# Patient Record
Sex: FEMALE | Race: WHITE | NOT HISPANIC OR LATINO | Employment: UNEMPLOYED | URBAN - METROPOLITAN AREA
[De-identification: names, ages, dates, MRNs, and addresses within clinical notes are randomized per-mention and may not be internally consistent; named-entity substitution may affect disease eponyms.]

---

## 2017-03-02 ENCOUNTER — ALLSCRIPTS OFFICE VISIT (OUTPATIENT)
Dept: OTHER | Facility: OTHER | Age: 57
End: 2017-03-02

## 2017-03-02 DIAGNOSIS — M54.42 LOW BACK PAIN WITH LEFT-SIDED SCIATICA: ICD-10-CM

## 2017-03-02 DIAGNOSIS — M25.552 PAIN IN LEFT HIP: ICD-10-CM

## 2017-03-02 DIAGNOSIS — M25.551 PAIN IN RIGHT HIP: ICD-10-CM

## 2017-03-02 DIAGNOSIS — M25.561 PAIN IN RIGHT KNEE: ICD-10-CM

## 2017-03-03 ENCOUNTER — HOSPITAL ENCOUNTER (OUTPATIENT)
Dept: RADIOLOGY | Facility: MEDICAL CENTER | Age: 57
Discharge: HOME | End: 2017-03-03
Payer: COMMERCIAL

## 2017-03-03 DIAGNOSIS — M25.561 PAIN IN RIGHT KNEE: ICD-10-CM

## 2017-03-03 DIAGNOSIS — M25.551 PAIN IN RIGHT HIP: ICD-10-CM

## 2017-03-03 DIAGNOSIS — M25.552 PAIN IN LEFT HIP: ICD-10-CM

## 2017-03-03 PROCEDURE — 73521 X-RAY EXAM HIPS BI 2 VIEWS: CPT

## 2017-03-03 PROCEDURE — 73562 X-RAY EXAM OF KNEE 3: CPT

## 2017-03-04 ENCOUNTER — GENERIC CONVERSION - ENCOUNTER (OUTPATIENT)
Dept: OTHER | Facility: OTHER | Age: 57
End: 2017-03-04

## 2017-03-09 ENCOUNTER — TRANSCRIBE ORDERS (OUTPATIENT)
Dept: ADMINISTRATIVE | Facility: HOSPITAL | Age: 57
End: 2017-03-09

## 2017-03-09 ENCOUNTER — HOSPITAL ENCOUNTER (OUTPATIENT)
Dept: RADIOLOGY | Facility: MEDICAL CENTER | Age: 57
Discharge: HOME | End: 2017-03-09
Payer: COMMERCIAL

## 2017-03-09 DIAGNOSIS — M54.42 LOW BACK PAIN WITH LEFT-SIDED SCIATICA: ICD-10-CM

## 2017-03-09 PROCEDURE — 72110 X-RAY EXAM L-2 SPINE 4/>VWS: CPT

## 2017-03-10 ENCOUNTER — GENERIC CONVERSION - ENCOUNTER (OUTPATIENT)
Dept: OTHER | Facility: OTHER | Age: 57
End: 2017-03-10

## 2017-03-13 ENCOUNTER — APPOINTMENT (OUTPATIENT)
Dept: PHYSICAL THERAPY | Facility: CLINIC | Age: 57
End: 2017-03-13
Payer: COMMERCIAL

## 2017-03-13 DIAGNOSIS — M25.561 PAIN IN RIGHT KNEE: ICD-10-CM

## 2017-03-13 DIAGNOSIS — M25.552 PAIN IN LEFT HIP: ICD-10-CM

## 2017-03-13 DIAGNOSIS — M25.551 PAIN IN RIGHT HIP: ICD-10-CM

## 2017-03-13 PROCEDURE — 97161 PT EVAL LOW COMPLEX 20 MIN: CPT

## 2017-03-13 PROCEDURE — 97110 THERAPEUTIC EXERCISES: CPT

## 2017-03-15 ENCOUNTER — GENERIC CONVERSION - ENCOUNTER (OUTPATIENT)
Dept: OTHER | Facility: OTHER | Age: 57
End: 2017-03-15

## 2017-03-21 ENCOUNTER — APPOINTMENT (OUTPATIENT)
Dept: PHYSICAL THERAPY | Facility: CLINIC | Age: 57
End: 2017-03-21
Payer: COMMERCIAL

## 2017-03-21 PROCEDURE — 97140 MANUAL THERAPY 1/> REGIONS: CPT

## 2017-03-21 PROCEDURE — 97110 THERAPEUTIC EXERCISES: CPT

## 2017-03-28 ENCOUNTER — APPOINTMENT (OUTPATIENT)
Dept: PHYSICAL THERAPY | Facility: CLINIC | Age: 57
End: 2017-03-28
Payer: COMMERCIAL

## 2017-05-17 ENCOUNTER — GENERIC CONVERSION - ENCOUNTER (OUTPATIENT)
Dept: OTHER | Facility: OTHER | Age: 57
End: 2017-05-17

## 2017-09-11 ENCOUNTER — GENERIC CONVERSION - ENCOUNTER (OUTPATIENT)
Dept: FAMILY MEDICINE CLINIC | Facility: CLINIC | Age: 57
End: 2017-09-11

## 2017-11-15 ENCOUNTER — HOSPITAL ENCOUNTER (EMERGENCY)
Facility: HOSPITAL | Age: 57
Discharge: HOME | End: 2017-11-15
Attending: EMERGENCY MEDICINE | Admitting: EMERGENCY MEDICINE
Payer: COMMERCIAL

## 2017-11-15 ENCOUNTER — APPOINTMENT (EMERGENCY)
Dept: RADIOLOGY | Facility: HOSPITAL | Age: 57
End: 2017-11-15
Payer: COMMERCIAL

## 2017-11-15 VITALS
WEIGHT: 155 LBS | SYSTOLIC BLOOD PRESSURE: 121 MMHG | BODY MASS INDEX: 27.46 KG/M2 | TEMPERATURE: 97.7 F | HEART RATE: 72 BPM | OXYGEN SATURATION: 99 % | RESPIRATION RATE: 18 BRPM | DIASTOLIC BLOOD PRESSURE: 63 MMHG

## 2017-11-15 DIAGNOSIS — Q24.8 PERICARDIAL CYST: ICD-10-CM

## 2017-11-15 DIAGNOSIS — R10.13 EPIGASTRIC ABDOMINAL PAIN: Primary | ICD-10-CM

## 2017-11-15 LAB
ALBUMIN SERPL BCP-MCNC: 3.4 G/DL (ref 3.5–5)
ALP SERPL-CCNC: 69 U/L (ref 46–116)
ALT SERPL W P-5'-P-CCNC: 54 U/L (ref 12–78)
ANION GAP SERPL CALCULATED.3IONS-SCNC: 3 MMOL/L (ref 4–13)
AST SERPL W P-5'-P-CCNC: 47 U/L (ref 5–45)
ATRIAL RATE: 63 BPM
ATRIAL RATE: 69 BPM
BASOPHILS # BLD AUTO: 0.03 THOUSANDS/ΜL (ref 0–0.1)
BASOPHILS NFR BLD AUTO: 1 % (ref 0–1)
BILIRUB SERPL-MCNC: 0.25 MG/DL (ref 0.2–1)
BUN SERPL-MCNC: 20 MG/DL (ref 5–25)
CALCIUM SERPL-MCNC: 8.7 MG/DL (ref 8.3–10.1)
CHLORIDE SERPL-SCNC: 108 MMOL/L (ref 100–108)
CO2 SERPL-SCNC: 26 MMOL/L (ref 21–32)
CREAT SERPL-MCNC: 0.43 MG/DL (ref 0.6–1.3)
EOSINOPHIL # BLD AUTO: 0.03 THOUSAND/ΜL (ref 0–0.61)
EOSINOPHIL NFR BLD AUTO: 1 % (ref 0–6)
ERYTHROCYTE [DISTWIDTH] IN BLOOD BY AUTOMATED COUNT: 13.4 % (ref 11.6–15.1)
GFR SERPL CREATININE-BSD FRML MDRD: 115 ML/MIN/1.73SQ M
GLUCOSE SERPL-MCNC: 146 MG/DL (ref 65–140)
HCT VFR BLD AUTO: 36.2 % (ref 34.8–46.1)
HGB BLD-MCNC: 12.5 G/DL (ref 11.5–15.4)
LYMPHOCYTES # BLD AUTO: 1.88 THOUSANDS/ΜL (ref 0.6–4.47)
LYMPHOCYTES NFR BLD AUTO: 33 % (ref 14–44)
MCH RBC QN AUTO: 29.1 PG (ref 26.8–34.3)
MCHC RBC AUTO-ENTMCNC: 34.5 G/DL (ref 31.4–37.4)
MCV RBC AUTO: 84 FL (ref 82–98)
MONOCYTES # BLD AUTO: 0.37 THOUSAND/ΜL (ref 0.17–1.22)
MONOCYTES NFR BLD AUTO: 6 % (ref 4–12)
NEUTROPHILS # BLD AUTO: 3.42 THOUSANDS/ΜL (ref 1.85–7.62)
NEUTS SEG NFR BLD AUTO: 59 % (ref 43–75)
NRBC BLD AUTO-RTO: 0 /100 WBCS
P AXIS: 46 DEGREES
P AXIS: 55 DEGREES
PLATELET # BLD AUTO: 240 THOUSANDS/UL (ref 149–390)
PMV BLD AUTO: 9.1 FL (ref 8.9–12.7)
POTASSIUM SERPL-SCNC: 3.4 MMOL/L (ref 3.5–5.3)
PR INTERVAL: 134 MS
PR INTERVAL: 138 MS
PROT SERPL-MCNC: 7.3 G/DL (ref 6.4–8.2)
QRS AXIS: 16 DEGREES
QRS AXIS: 18 DEGREES
QRSD INTERVAL: 80 MS
QRSD INTERVAL: 84 MS
QT INTERVAL: 398 MS
QT INTERVAL: 420 MS
QTC INTERVAL: 407 MS
QTC INTERVAL: 450 MS
RBC # BLD AUTO: 4.29 MILLION/UL (ref 3.81–5.12)
SODIUM SERPL-SCNC: 137 MMOL/L (ref 136–145)
T WAVE AXIS: 37 DEGREES
T WAVE AXIS: 37 DEGREES
TROPONIN I SERPL-MCNC: <0.02 NG/ML
TROPONIN I SERPL-MCNC: <0.02 NG/ML
VENTRICULAR RATE: 63 BPM
VENTRICULAR RATE: 69 BPM
WBC # BLD AUTO: 5.76 THOUSAND/UL (ref 4.31–10.16)

## 2017-11-15 PROCEDURE — 99284 EMERGENCY DEPT VISIT MOD MDM: CPT

## 2017-11-15 PROCEDURE — 93005 ELECTROCARDIOGRAM TRACING: CPT | Performed by: EMERGENCY MEDICINE

## 2017-11-15 PROCEDURE — 84484 ASSAY OF TROPONIN QUANT: CPT | Performed by: EMERGENCY MEDICINE

## 2017-11-15 PROCEDURE — 71020 HB CHEST X-RAY 2VW FRONTAL&LATL: CPT | Performed by: EMERGENCY MEDICINE

## 2017-11-15 PROCEDURE — 36415 COLL VENOUS BLD VENIPUNCTURE: CPT

## 2017-11-15 PROCEDURE — 80053 COMPREHEN METABOLIC PANEL: CPT | Performed by: EMERGENCY MEDICINE

## 2017-11-15 PROCEDURE — 85025 COMPLETE CBC W/AUTO DIFF WBC: CPT | Performed by: EMERGENCY MEDICINE

## 2017-11-15 RX ORDER — MULTIVITAMIN
1 TABLET ORAL DAILY
COMMUNITY

## 2017-11-15 RX ORDER — RIBOFLAVIN (VITAMIN B2) 100 MG
100 TABLET ORAL DAILY
COMMUNITY

## 2017-11-15 RX ORDER — FAMOTIDINE 20 MG/1
20 TABLET, FILM COATED ORAL 2 TIMES DAILY
Qty: 30 TABLET | Refills: 0 | Status: SHIPPED | OUTPATIENT
Start: 2017-11-15 | End: 2018-11-20 | Stop reason: ALTCHOICE

## 2017-11-15 NOTE — ED PROVIDER NOTES
History  Chief Complaint   Patient presents with    Abdominal Pain     P:t had sudden onset of epigastric pain that radiated into her chest that has since gone away  Pt is pain free upon arrival       HPI    72-year-old female with unremarkable past medical history presenting with chief complaint of epigastric pain that radiated into her chest and has completely gone away on presentation to the ED  This episode lasted for 5 minutes  Patient is pain free now  Pain was described as gassy crampy 10/10 pain with radiation up to her chest   Patient has never had anything like this before  No aggravating or remitting any factors  Patient is asymptomatic  Patient admitted to nausea without vomiting  Patient admitted to diaphoresis as well  Patient denies fever chills rigors headache lightheadedness dizziness chest pain palpitations shortness of breath cough pleurisy vomiting diarrhea constipation urinary symptoms, motor weakness, numbness and tingling  Patient has never had the VTE before, no recent long traveling, no exogenous hormones  Patient had a normal breakfast this morning, had a routine last 24 hours according to patient  Prior to Admission Medications   Prescriptions Last Dose Informant Patient Reported? Taking? Ascorbic Acid (VITAMIN C) 100 MG tablet 11/15/2017 at Unknown time  Yes Yes   Sig: Take 100 mg by mouth daily   Multiple Vitamin (MULTIVITAMIN) tablet 11/15/2017 at Unknown time  Yes Yes   Sig: Take 1 tablet by mouth daily   b complex vitamins tablet 11/15/2017 at Unknown time  Yes Yes   Sig: Take 1 tablet by mouth daily   ergocalciferol (VITAMIN D2) 50,000 units 11/15/2017 at Unknown time  Yes Yes   Sig: Take 1,000 Units by mouth daily        Facility-Administered Medications: None       History reviewed  No pertinent past medical history      Past Surgical History:   Procedure Laterality Date    CHOLECYSTECTOMY      NO PAST SURGERIES      LA COLONOSCOPY FLX DX W/COLLJ SPEC WHEN PFRMD N/A 4/26/2016    Procedure: COLONOSCOPY;  Surgeon: Teresa Porter MD;  Location: [facility]; Service: Gastroenterology       History reviewed  No pertinent family history  I have reviewed and agree with the history as documented  Social History   Substance Use Topics    Smoking status: Never Smoker    Smokeless tobacco: Never Used    Alcohol use No        Review of Systems   Constitutional: Negative for activity change, appetite change, chills, diaphoresis, fatigue, fever and unexpected weight change  HENT: Negative for congestion, ear discharge, ear pain, facial swelling, hearing loss, nosebleeds, postnasal drip, rhinorrhea, sinus pressure, sneezing, sore throat and tinnitus  Eyes: Negative for photophobia, pain, redness, itching and visual disturbance  Respiratory: Negative for cough, chest tightness, shortness of breath, wheezing and stridor  Cardiovascular: Negative for chest pain, palpitations and leg swelling  Gastrointestinal: Negative for abdominal distention, abdominal pain, anal bleeding, blood in stool, constipation, diarrhea, nausea and vomiting  Endocrine: Negative for polydipsia, polyphagia and polyuria  Genitourinary: Negative for decreased urine volume, difficulty urinating, dysuria, enuresis, flank pain, frequency, hematuria, menstrual problem, urgency, vaginal bleeding, vaginal discharge and vaginal pain  Musculoskeletal: Negative for arthralgias, back pain, gait problem, joint swelling, myalgias, neck pain and neck stiffness  Skin: Negative for rash and wound  Allergic/Immunologic: Negative for environmental allergies, food allergies and immunocompromised state  Neurological: Negative for dizziness, tremors, seizures, syncope, facial asymmetry, speech difficulty, weakness, light-headedness, numbness and headaches  Hematological: Negative for adenopathy     Psychiatric/Behavioral: Negative for agitation, behavioral problems, confusion, dysphoric mood, hallucinations, self-injury, sleep disturbance and suicidal ideas  The patient is not nervous/anxious and is not hyperactive  All other systems reviewed and are negative  Physical Exam  ED Triage Vitals   Temperature Pulse Respirations Blood Pressure SpO2   11/15/17 1039 11/15/17 1039 11/15/17 1039 11/15/17 1039 11/15/17 1039   97 7 °F (36 5 °C) 65 18 132/63 98 %      Temp Source Heart Rate Source Patient Position - Orthostatic VS BP Location FiO2 (%)   11/15/17 1039 11/15/17 1505 11/15/17 1039 11/15/17 1039 --   Oral Monitor Lying Left arm       Pain Score       11/15/17 1039       No Pain           Orthostatic Vital Signs  Vitals:    11/15/17 1133 11/15/17 1233 11/15/17 1505 11/15/17 1600   BP: 129/67 117/77 115/71 121/63   Pulse: 65 69 68 72   Patient Position - Orthostatic VS:   Lying Sitting       Physical Exam   Constitutional: She is oriented to person, place, and time  She appears well-developed and well-nourished  No distress  HENT:   Head: Normocephalic and atraumatic  Right Ear: External ear normal    Left Ear: External ear normal    Nose: Nose normal    Mouth/Throat: Oropharynx is clear and moist  No oropharyngeal exudate  Eyes: Conjunctivae and EOM are normal  Pupils are equal, round, and reactive to light  Right eye exhibits no discharge  Left eye exhibits no discharge  No scleral icterus  Neck: Normal range of motion  Neck supple  No JVD present  No tracheal deviation present  No thyromegaly present  Cardiovascular: Normal rate, regular rhythm, S1 normal, S2 normal, normal heart sounds and intact distal pulses  No murmur heard  Pulses:       Carotid pulses are 2+ on the right side, and 2+ on the left side  Radial pulses are 2+ on the right side, and 2+ on the left side  Dorsalis pedis pulses are 2+ on the right side, and 2+ on the left side  Posterior tibial pulses are 2+ on the right side, and 2+ on the left side     Pulmonary/Chest: Effort normal and breath sounds normal  No stridor  No respiratory distress  She has no wheezes  Abdominal: Soft  Bowel sounds are normal  She exhibits no distension and no mass  There is tenderness (mild) in the epigastric area  There is no rebound and no guarding  No hernia  Musculoskeletal: Normal range of motion  She exhibits no edema, tenderness or deformity  RLU and LLE: Homans sign negative bilaterally, no calf erythema bilaterally, no palpable cords bilaterally no erythema or edema  Lymphadenopathy:     She has no cervical adenopathy  Neurological: She is alert and oriented to person, place, and time  She displays normal reflexes  No cranial nerve deficit  She exhibits normal muscle tone  Skin: Skin is warm and dry  No rash noted  She is not diaphoretic  No erythema  Psychiatric: She has a normal mood and affect  Her behavior is normal  Judgment and thought content normal    Nursing note and vitals reviewed  ED Medications  Medications - No data to display    Diagnostic Studies  Results Reviewed     Procedure Component Value Units Date/Time    Troponin I [[id number]]  (Normal) Collected:  11/15/17 1502    Lab Status:  Final result Specimen:  Blood from Arm, Left Updated:  11/15/17 1545     Troponin I <0 02 ng/mL     Narrative:         [employer] Chemistry analyzer 99% cutoff is > 0 04 ng/mL in network labs    o cTnI 99% cutoff is useful only when applied to patients in the clinical setting of myocardial ischemia  o cTnI 99% cutoff should be interpreted in the context of clinical history, ECG findings and possibly cardiac imaging to establish correct diagnosis  o cTnI 99% cutoff may be suggestive but clearly not indicative of a coronary event without the clinical setting of myocardial ischemia      Comprehensive metabolic panel [[id number]]  (Abnormal) Collected:  11/15/17 1101    Lab Status:  Final result Specimen:  Blood from Arm, Left Updated:  11/15/17 1129     Sodium 137 mmol/L      Potassium 3 4 (L) mmol/L      Chloride 108 mmol/L CO2 26 mmol/L      Anion Gap 3 (L) mmol/L      BUN 20 mg/dL      Creatinine 0 43 (L) mg/dL      Glucose 146 (H) mg/dL      Calcium 8 7 mg/dL      AST 47 (H) U/L      ALT 54 U/L      Alkaline Phosphatase 69 U/L      Total Protein 7 3 g/dL      Albumin 3 4 (L) g/dL      Total Bilirubin 0 25 mg/dL      eGFR 115 ml/min/1 73sq m     Narrative:         National Kidney Disease Education Program recommendations are as follows:  GFR calculation is accurate only with a steady state creatinine  Chronic Kidney disease less than 60 ml/min/1 73 sq  meters  Kidney failure less than 15 ml/min/1 73 sq  meters  Troponin I [[id number]]  (Normal) Collected:  11/15/17 1101    Lab Status:  Final result Specimen:  Blood from Arm, Left Updated:  11/15/17 1129     Troponin I <0 02 ng/mL     Narrative:         [employer] Chemistry analyzer 99% cutoff is > 0 04 ng/mL in network labs    o cTnI 99% cutoff is useful only when applied to patients in the clinical setting of myocardial ischemia  o cTnI 99% cutoff should be interpreted in the context of clinical history, ECG findings and possibly cardiac imaging to establish correct diagnosis  o cTnI 99% cutoff may be suggestive but clearly not indicative of a coronary event without the clinical setting of myocardial ischemia      CBC and differential [[id number]]  (Normal) Collected:  11/15/17 1101    Lab Status:  Final result Specimen:  Blood from Arm, Left Updated:  11/15/17 1112     WBC 5 76 Thousand/uL      RBC 4 29 Million/uL      Hemoglobin 12 5 g/dL      Hematocrit 36 2 %      MCV 84 fL      MCH 29 1 pg      MCHC 34 5 g/dL      RDW 13 4 %      MPV 9 1 fL      Platelets 700 Thousands/uL      nRBC 0 /100 WBCs      Neutrophils Relative 59 %      Lymphocytes Relative 33 %      Monocytes Relative 6 %      Eosinophils Relative 1 %      Basophils Relative 1 %      Neutrophils Absolute 3 42 Thousands/µL      Lymphocytes Absolute 1 88 Thousands/µL      Monocytes Absolute 0 37 Thousand/µL Eosinophils Absolute 0 03 Thousand/µL      Basophils Absolute 0 03 Thousands/µL                  X-ray chest 2 views   ED Interpretation by Anjel Curtis DO (11/15 1130)   There is a nodular density in the right cardiophrenic angle, appearing increased from the prior exam, indeterminate but possibly a pericardial cyst or pericardial fat  Nonemergent CT evaluation recommended  Final Result by Hiral Green MD (11/15 1122)   There is a nodular density in the right cardiophrenic angle, appearing increased from the prior exam, indeterminate but possibly a pericardial cyst or pericardial fat  Nonemergent CT evaluation recommended           ##imslh##imslh      ##[id number]##[id number]         Workstation performed: [id number]               Procedures  ECG 12 Lead Documentation  Date/Time: 11/15/2017 12:06 PM  Performed by: Claudia De La Cruz by: Pablo Ramachandran     Indications / Diagnosis:  Abdominal pain  ECG reviewed by me, the ED Provider: yes    Patient location:  ED  Previous ECG:     Previous ECG:  Compared to current    Similarity:  No change  Interpretation:     Interpretation: abnormal    Rate:     ECG rate:  63    ECG rate assessment: normal    Rhythm:     Rhythm: sinus rhythm    Ectopy:     Ectopy: none    QRS:     QRS axis:  Normal  Conduction:     Conduction: normal    ST segments:     ST segments:  Normal  T waves:     T waves: flattening      Flattening:  AVL and III  ECG 12 Lead Documentation  Date/Time: 11/15/2017 11:45 AM  Performed by: Claudia De La Cruz by: Pablo Ramachandran     Patient location:  ED  Previous ECG:     Previous ECG:  Compared to current    Similarity:  No change  Interpretation:     Interpretation: abnormal    Quality:     Tracing quality:  Limited by artifact  Rate:     ECG rate assessment: normal    Rhythm:     Rhythm: sinus rhythm    Ectopy:     Ectopy: none    QRS:     QRS axis:  Normal  Conduction:     Conduction: normal    ST segments:     ST segments: Normal  T waves:     T waves: flattening and inverted      Flattening:  V4    Inverted:  V2 and V3          Phone Consults  ED Phone Contact    ED Course  ED Course as of Nov 16 1604   Wed Nov 15, 2017   1305 Patient asymptomatic at this time    1510 Patient is pain-free at this time          HEART Risk Score    Flowsheet Row Most Recent Value   History  0 Filed at: 11/15/2017 1135   ECG  1 Filed at: 11/15/2017 1135   Age  1 Filed at: 11/15/2017 1135   Risk Factors  1 Filed at: 11/15/2017 1135   Troponin  0 Filed at: 11/15/2017 1135   Heart Score Risk Calculator   History  0 Filed at: 11/15/2017 1135   ECG  1 Filed at: 11/15/2017 1135   Age  1 Filed at: 11/15/2017 1135   Risk Factors  1 Filed at: 11/15/2017 1135   Troponin  0 Filed at: 11/15/2017 1135   HEART Score  3 Filed at: 11/15/2017 1135   HEART Score  3 Filed at: 11/15/2017 1135            PERC Rule for PE    Flowsheet Row Most Recent Value   PERC Rule for PE   Age >=50  0 Filed at: 11/16/2017 1532   HR >=100  0 Filed at: 11/16/2017 1532   O2 Sat on room air < 95%  0 Filed at: 11/16/2017 1532   History of PE or DVT  0 Filed at: 11/16/2017 1532   Recent trauma or surgery  0 Filed at: 11/16/2017 1532   Hemoptysis  0 Filed at: 11/16/2017 1532   Exogenous estrogen  0 Filed at: 11/16/2017 1532   Unilateral leg swelling  0 Filed at: 11/16/2017 1532   PERC Rule for PE Results  0 Filed at: 11/16/2017 1532                Wells' Criteria for PE    Flowsheet Row Most Recent Value   Wells' Criteria for PE   Clinical signs and symptoms of DVT  0 Filed at: 11/16/2017 1532   PE is primary diagnosis or equally likely  0 Filed at: 11/16/2017 1532   HR >100  0 Filed at: 11/16/2017 1532   Immobilization at least 3 days or Surgery in the previous 4 weeks  0 Filed at: 11/16/2017 1532   Previous, objectively diagnosed PE or DVT  0 Filed at: 11/16/2017 1532   Hemoptysis  0 Filed at: 11/16/2017 1532   Malignancy with treatment within 6 months or palliative  0 Filed at: 11/16/2017 3101 [employer]  0 Filed at: 11/16/2017 1532            [facility]  Number of Diagnoses or Management Options  Epigastric abdominal pain:   Pericardial cyst:   Diagnosis management comments: 32-year-old female presenting with chief complaint of epigastric pain which has remitted  Patient has limited past medical history  On exam vital signs normal  Patient does not have any abdominal tenderness, benign physical exam   Heart score of 3 initial EKG limited by artifact, repeat EKG T-wave abnormalities, flattening in aVL and lead 3  Delta EKG at 3 hrs showed normal sinus rhythm, persistent flattening in aVL only 3  Heart score of 3  Initial troponin and delta troponin negative  Patient asymptomatic through ED evaluation  Patient will have echo stress test in the next 1-2 days and Cardiology follow-up in the next 1-2 days  Discussed plan with patient she agrees to follow up with Cardiology and stress test in the next 1-2 days  Shared decision making was discussed with the patient  Offered admission to patient, patient states she is ready to go home as she is asymptomatic  Discussed the risk and benefits of this decision, patient accepts risks of discharge and understands benefits of admission  Patient educated on pericardial cyst, patient will follow up with PCP for CT scan for follow-up  Chest x-ray report given to patient  ED return precautions discussed  CritCare Time    Disposition  Final diagnoses:   Epigastric abdominal pain   Pericardial cyst     Time reflects when diagnosis was documented in both MDM as applicable and the Disposition within this note     Time User Action Codes Description Comment    11/15/2017  3:53 PM Tabitha Pineda Add [R10 13] Epigastric abdominal pain     11/15/2017  3:53 PM Sohan Moralez Add [Q24 8] Pericardial cyst       ED Disposition     ED Disposition Condition Comment    Discharge  Jasvir Salazar discharge to home/self care      Condition at discharge: Good    Return precautions were discussed with patient  Patient understands when to return to  Emergency department  Patient agrees to discharge plan and follow up care  Follow-up Information     Follow up With Specialties Details Why Contact Info    James Reddy DO Family Medicine Go in 1 day  [address] [facility]  [phone]      Pamila Lesches, MD Cardiology Call in 1 day  [address]  [phone]          Discharge Medication List as of 11/15/2017  4:12 PM      START taking these medications    Details   famotidine (PEPCID) 20 mg tablet Take 1 tablet by mouth 2 (two) times a day, Starting Wed 11/15/2017, Print         CONTINUE these medications which have NOT CHANGED    Details   Ascorbic Acid (VITAMIN C) 100 MG tablet Take 100 mg by mouth daily, Historical Med      b complex vitamins tablet Take 1 tablet by mouth daily, Historical Med      ergocalciferol (VITAMIN D2) 50,000 units Take 1,000 Units by mouth daily  , Historical Med      Multiple Vitamin (MULTIVITAMIN) tablet Take 1 tablet by mouth daily, Historical Med             Outpatient Discharge Orders  Echo stress test w contrast if indicated   Standing Status: Future  Standing Exp  Date: 11/15/21         ED Provider  Attending physically available and evaluated [facility]  I managed the patient along with the ED Attending      Electronically Signed by         Sarah Eric DO  Resident  11/16/17 7898

## 2017-11-15 NOTE — DISCHARGE INSTRUCTIONS
Abdominal Pain   WHAT YOU NEED TO KNOW:   Abdominal pain can be dull, achy, or sharp  You may have pain in one area of your abdomen, or in your entire abdomen  Your pain may be caused by a condition such as constipation, food sensitivity or poisoning, infection, or a blockage  Abdominal pain can also be from a hernia, appendicitis, or an ulcer  Liver, gallbladder, or kidney conditions can also cause abdominal pain  The cause of your abdominal pain may be unknown  DISCHARGE INSTRUCTIONS:   Return to the emergency department if:   · You have new chest pain or shortness of breath  · You have pulsing pain in your upper abdomen or lower back that suddenly becomes constant  · Your pain is in the right lower abdominal area and worsens with movement  · You have a fever over 100 4°F (38°C) or shaking chills  · You are vomiting and cannot keep food or liquids down  · Your pain does not improve or gets worse over the next 8 to 12 hours  · You see blood in your vomit or bowel movements, or they look black and tarry  · Your skin or the whites of your eyes turn yellow  · You are a woman and have a large amount of vaginal bleeding that is not your monthly period  Contact your healthcare provider if:   · You have pain in your lower back  · You are a man and have pain in your testicles  · You have pain when you urinate  · You have questions or concerns about your condition or care  Follow up with your healthcare provider within 24 hours or as directed:  Write down your questions so you remember to ask them during your visits  Medicines:   · Medicines  may be given to calm your stomach and prevent vomiting or to decrease pain  Ask how to take pain medicine safely  · Take your medicine as directed  Contact your healthcare provider if you think your medicine is not helping or if you have side effects  Tell him of her if you are allergic to any medicine   Keep a list of the medicines, vitamins, and herbs you take  Include the amounts, and when and why you take them  Bring the list or the pill bottles to follow-up visits  Carry your medicine list with you in case of an emergency  © 2017 [employer] [employer] Information is for End User's use only and may not be sold, redistributed or otherwise used for commercial purposes  All illustrations and images included in CareNotes® are the copyrighted property of [employer]  or [address]  The above information is an  only  It is not intended as medical advice for individual conditions or treatments  Talk to your doctor, nurse or pharmacist before following any medical regimen to see if it is safe and effective for you

## 2017-11-15 NOTE — ED ATTENDING ATTESTATION
Connie Sams DO, saw and evaluated the patient  I have discussed the patient with the resident/non-physician practitioner and agree with the resident's/non-physician practitioner's findings, Plan of Care, and MDM as documented in the resident's/non-physician practitioner's note, except where noted  All available labs and Radiology studies were reviewed  At this point I agree with the current assessment done in the Emergency Department  I have conducted an independent evaluation of this patient a history and physical is as follows:    56 yof with PMH of cholecystectomy reports sudden onset of epicastric pain lasting 5 minutes, occurred one hour ago and she is now asymptomatic  She was diaphoretic at the time  She is now asymptomatic  She denies eating anything abnormal that would perhaps elicit any GERD  Her EKG shows nonspecific repolarization abnormalities in V2 and V3 with artifact and will be repeated  Troponin was negative  CMP was negative  Lipase added  Chest x-ray revealed a diaphragmatic cyst and follow-up is recommended  /63   Pulse 65   Temp 97 7 °F (36 5 °C) (Oral)   Resp 18   Wt 70 3 kg (155 lb)   SpO2 98%   BMI 27 46 kg/m²     Patient generally well   alert oriented x4   heart was regular   lungs were clear   abdomen benign   extremities unremarkable     heart score is 3 and plan for delta troponin and repeat EKG per hospital protocol  delta troponin negative and multiple EKGs without ischemia or dynamic changes  Prescription for stress test provided and follow-up recommended with PCP tomorrow  Patient agrees with this plan        Diagnosis   epigastric pain   diaphragmatic nodule  Versus cyst as found incidentally on  Chest x-ray  Critical Care Time  CritCare Time

## 2017-11-16 LAB
ATRIAL RATE: 72 BPM
P AXIS: 60 DEGREES
PR INTERVAL: 128 MS
QRS AXIS: 25 DEGREES
QRSD INTERVAL: 82 MS
QT INTERVAL: 400 MS
QTC INTERVAL: 438 MS
T WAVE AXIS: 53 DEGREES
VENTRICULAR RATE: 72 BPM

## 2017-12-06 ENCOUNTER — GENERIC CONVERSION - ENCOUNTER (OUTPATIENT)
Dept: OTHER | Facility: OTHER | Age: 57
End: 2017-12-06

## 2017-12-06 DIAGNOSIS — Z12.39 ENCOUNTER FOR OTHER SCREENING FOR MALIGNANT NEOPLASM OF BREAST: ICD-10-CM

## 2017-12-08 ENCOUNTER — GENERIC CONVERSION - ENCOUNTER (OUTPATIENT)
Dept: OTHER | Facility: OTHER | Age: 57
End: 2017-12-08

## 2017-12-08 ENCOUNTER — HOSPITAL ENCOUNTER (OUTPATIENT)
Dept: NON INVASIVE DIAGNOSTICS | Facility: HOSPITAL | Age: 57
Discharge: HOME | End: 2017-12-08
Attending: EMERGENCY MEDICINE
Payer: COMMERCIAL

## 2017-12-08 DIAGNOSIS — R10.13 EPIGASTRIC ABDOMINAL PAIN: ICD-10-CM

## 2017-12-08 LAB
CHEST PAIN STATEMENT: NORMAL
MAX DIASTOLIC BP: 60 MMHG
MAX HEART RATE: 179 BPM
MAX PREDICTED HEART RATE: 165 BPM
MAX. SYSTOLIC BP: 164 MMHG
PROTOCOL NAME: NORMAL
REASON FOR TERMINATION: NORMAL
TARGET HR FORMULA: NORMAL
TEST INDICATION: NORMAL
TIME IN EXERCISE PHASE: NORMAL

## 2017-12-08 PROCEDURE — 93350 STRESS TTE ONLY: CPT

## 2017-12-21 ENCOUNTER — ALLSCRIPTS OFFICE VISIT (OUTPATIENT)
Dept: OTHER | Facility: OTHER | Age: 57
End: 2017-12-21

## 2017-12-22 NOTE — PROGRESS NOTES
Assessment   1  Anterior chest wall pain (786 52) (R07 89)   2  Pericardial cyst (236 89) (Q24 8)    Plan    · EKG/ECG- POC; Status:Complete;   Done: [id number]   Perform: In Office; [phone]; Last Updated By:Amalia Dolan; 12/21/2017 10:15:43 AM;Ordered; For:Palpitations; Ordered By:Tae Angel;   · CTA CHEST WO W CONTRAST; Status:Need Information - Financial Authorization; Requested for:[id number];    Perform:[facility]; GUN:[id number]; Ordered; For:Pericardial cyst; Ordered By:Tae Angel;   · Follow-up visit in 1 month Evaluation and Treatment  Follow-up  Status: Hold For -    Scheduling  Requested for: [id number]   Ordered; For: Pericardial cyst; Ordered By: Lilli Solorio Performed:  Due: 06RHQ6733    Discussion/Summary      Episode of chest and abdominal pain  She had an outpatient stress echocardiogram that was normal  A chest x-ray was concerning for pericardial cyst  I will have her do a CT of the chest to better delineate a mass seen on chest x-ray  It may represent a pericardial cyst  I will follow up with her make further recommendations  Chief Complaint   patient at the office for initial consult, patient was at the hospital due to sharp pain on her chest       History of Present Illness   HPI: A 61-year-old female with no cardiac history who had presented to the emergency department November 15, 2017 with lower chest and upper abdominal pain that was sharp and intense but transient  Workup was unremarkable on and it was felt to have a GI source  She has had no pain since  She denies chest pain shortness of breath orthopnea paroxysmal nocturnal dyspnea or syncope    had chest what x-ray while in the ED  It was read as having a nodular density in the right cardiophrenic angle  Increased from prior exam and may be a pericardial cyst of pericardial fat  Review of Systems           Cardiac: No complaints of chest pain, no palpitations, no fainting -- and-- as noted in HPI  Skin: No complaints of nonhealing sores or skin rash  Genitourinary: No complaints of recurrent urinary tract infections, frequent urination at night, difficult urination, blood in urine, kidney stones, loss of bladder control, kidney problems, denies any birth control or hormone replacement, is not post menopausal, not currently pregnant  Psychological: No complaints of feeling depressed, anxiety, panic attacks, or difficulty concentrating  General: No complaints of trouble sleeping, lack of energy, fatigue, appetite changes, weight changes, fever, frequent infections, or night sweats  Respiratory: No complaints of shortness of breath, cough with sputum, or wheezing  HEENT: No complaints of serious problems, hearing problems, nose problems, throat problems, or snoring  Gastrointestinal: No complaints of liver problems, nausea, vomiting, heartburn, constipation, bloody stools, diarrhea, problems swallowing, adbominal pain, or rectal bleeding  Hematologic: No complaints of bleeding disorders, anemia, blood clots, or excessive brusing  Neurological: No complaints of numbness, tingling, dizziness, weakness, seizures, headaches, syncope or fainting, AM fatigue, daytime sleepiness, no witnessed apnea episodes  Musculoskeletal: No complaints of arthritis, back pain, or painfull swelling  ROS reviewed  Active Problems   1  Acute left-sided low back pain with left-sided sciatica (724 2,724 3) (M54 42)   2  Acute pain of right knee (719 46) (M25 561)   3  Anxiety (300 00) (F41 9)   4  Encounter for other screening for malignant neoplasm of breast (V76 19) (Z12 39)   5  Hip pain, left (719 45) (M25 552)   6  Hip pain, right (719 45) (M25 551)   7  Hyperlipidemia (272 4) (E78 5)   8  Influenza vaccine needed (V04 81) (Z23)   9  Shortness of breath (786 05) (R06 02)   10  Solitary pulmonary nodule (793 11) (R91 1)   11   Vitamin D deficiency (268 9) (E55 9)    Past Medical History    · History of Encounter for routine gynecological examination (V72 31) (Z01 419)   · History of Encounter for screening colonoscopy (V76 51) (Z12 11)   · History of Encounter for screening mammogram for malignant neoplasm of breast    (V76 12) (Z12 31)   · History of Encounter for vitamin deficiency screening (V77 99) (Z13 21)   · History of Encounter to establish care with new doctor (V65 8) (Z76 89)   · History of Flu vaccine need (V04 81) (Z23)   · History of abdominal pain (V13 89) (J30 024)   · History of acute pharyngitis (V12 69) (Z87 09)   · History of influenza vaccination (V49 89) (Z92 29)   · History of Need for Tdap vaccination (V06 1) (Z23)   · History of Right knee pain (719 46) (M25 561)   · History of Screening for cholesterol level (V77 91) (Z13 220)   · History of Screening for thyroid disorder (V77 0) (Z13 29)     The active problems and past medical history were reviewed and updated today  Surgical History    · History of Cholecystotomy     The surgical history was reviewed and updated today  Family History   Mother    · No pertinent family history  Family History    · Family history of No Significant Family History     The family history was reviewed and updated today  Social History    · Denied: History of Alcohol Use (History)   · Never a smoker   · Never A Smoker  The social history was reviewed and updated today  The social history was reviewed and is unchanged  Current Meds    1  Multiple Vitamin CAPS; take 1 capsule daily; Therapy: (Lorrane Delonte) to Recorded   2  Vitamin B Complex CAPS; take 1 capsule daily; Therapy: (Lorrane Delonte) to Recorded   3  Vitamin C TABS; TAKE 1 TABLET DAILY; Therapy: (Lorrane Delonte) to Recorded   4  Vitamin D 1000 UNIT Oral Tablet; TAKE 1 TABLET DAILY; Therapy: (Recorded:17Oct5708) to Recorded     The medication list was reviewed and updated today  Allergies   1   No Known Drug Allergies    Vitals   Signs   Heart Rate: 69  Systolic: 980, RUE, Sitting  Diastolic: 80, RUE, Sitting  Height: 5 ft 3 in  Weight: 159 lb 3 oz  BMI Calculated: 28 2  BSA Calculated: 1 76    Physical Exam        Constitutional - General appearance: No acute distress, well appearing and well nourished  Eyes - Conjunctiva and Sclera examination: Conjunctiva pink, sclera anicteric  Neck - Normal, no JVD   Pulmonary - Respiratory effort: No signs of respiratory distress  -- Auscultation of lungs: Clear to auscultation  Cardiovascular - Auscultation of heart: Normal rate and rhythm, normal S1 and S2, no murmurs  -- Pedal pulses: Normal, 2+ bilaterally  -- Examination of extremities for edema and/or varicosities: Normal        Abdomen - Soft  Musculoskeletal - Gait and station: Normal gait  Skin - Skin: Normal without rashes  Skin is warm and well perfused  Neurologic - Speech normal  No focal deficits  Psychiatric - Orientation to person, place, and time: Normal       Results/Data        Rhythm and rate:  normal sinus rhythm        QRS: low voltage      Signatures    Electronically signed by : KVNG Marrero ; Dec 21 2017  3:40PM EST                       (Author)

## 2018-01-10 NOTE — RESULT NOTES
Verified Results  * XR SPINE LUMBAR MINIMUM 4 VIEWS NON INJURY [id number] 08:44AM Aarti Star Order Number: [id number]     Test Name Result Flag Reference   XR SPINE LUMBAR MINIMUM 4 VIEWS (Report)     LUMBAR SPINE     INDICATION: Low back pain with sciatica  Pain radiating down both legs for 3 to 4 months  No history of trauma  COMPARISON: None     VIEWS: AP, lateral, bilateral oblique and coned down projections     IMAGES: 5     FINDINGS:     Alignment is unremarkable  There is no radiographic evidence of acute fracture or destructive osseous lesion  Mild disc space narrowing throughout the lumbar spine  Degenerative endplate changes and associated facet hypertrophic changes throughout the lumbar spine  Visualized soft tissues appear unremarkable  IMPRESSION:   Mild degenerative changes         Workstation performed: [id number]     Signed by:   Dannie Snellen, DO   3/10/17

## 2018-01-12 NOTE — RESULT NOTES
Message  Ms  Jasvir Gramajomarcel,  Our office has attempted to reach you by phone in regards to your results from the Colonoscopy on 04/06/2016  The polyp that was removed was benign  With these results Dr Patrice Henriquez recommends a repeat Colonoscopy in 5 years  We will call you in that time to schedule  If you have any questions please call our office at [phone]      Sincerely,  [facility]      Signatures   Electronically signed by : Gigi Dangelo, ; Jul 14 2016  1:39PM EST                       (Author)

## 2018-01-13 VITALS
HEIGHT: 63 IN | HEART RATE: 75 BPM | RESPIRATION RATE: 16 BRPM | BODY MASS INDEX: 27.46 KG/M2 | SYSTOLIC BLOOD PRESSURE: 114 MMHG | TEMPERATURE: 96.9 F | DIASTOLIC BLOOD PRESSURE: 74 MMHG | OXYGEN SATURATION: 98 % | WEIGHT: 155 LBS

## 2018-01-13 NOTE — RESULT NOTES
Verified Results  * MAMMO SCREENING BILATERAL W CAD [id number] 10:00AM Rajat Cedeño     Test Name Result Flag Reference   MAMMO SCREENING BILATERAL W CAD (Report)     Patient History:   Patient is postmenopausal    No known family history of cancer  Patient has never smoked  Patient's BMI is 24 6  Reason for exam: screening (asymptomatic)  Mammo Screening Bilateral W CAD: March 1, 2016 - Check In #:    [de-identified]   Bilateral CC and MLO view(s) were taken  Technologist: Iris Veronica, RT(R)(M)   No prior studies available for comparison  The breast tissue is heterogeneously dense, potentially limiting    the sensitivity of mammography  Patient risk, included in this    report, assists in determining the appropriate screening regimen    (such as 3-D mammography or the inclusion of automated breast    ultrasound or MRI)  3-D mammography may also remain indicated as    screening  No dominant soft tissue mass, architectural distortion or    suspicious calcifications are noted  The skin and nipple    contours are within normal limits  No evidence of malignancy  No significant changes   when compared with prior studies  ASSESSMENT: BiRad:1 - Negative     Recommendation:   Routine screening mammogram of both breasts in 1 year  A    reminder letter will be sent  Analyzed by CAD     8-10% of cancers will be missed on mammography  Management of a    palpable abnormality must be based on clinical grounds  Patients   will be notified of their results via letter from our facility  Accredited by [employer] of Radiology and FDA  Transcription Location:  [place] [address]: [id number]     Risk Value(s):   Arianneer-Cuzick 10 Year: 1 617%, Tyrer-Cuzick Lifetime: 6 104%,    Myriad Table: 1 5%, TEODORA 5 Year: 1 0%, NCI Lifetime: 7 7%   Signed by:    Willis Erazo MD   3/1/16

## 2018-01-17 NOTE — RESULT NOTES
Verified Results  (1) CBC/PLT/DIFF [id number] 06:00AM Aristeo Courtney     Test Name Result Flag Reference   WHITE BLOOD CELL COUNT 5 4 Thousand/uL  3 8-10 8   RED BLOOD CELL COUNT 4 66 Million/uL  3 80-5 10   HEMOGLOBIN 13 2 g/dL  11 7-15 5   HEMATOCRIT 40 7 %  35 0-45 0   MCV 87 3 fL  80 0-100 0   MCH 28 4 pg  27 0-33 0   MCHC 32 5 g/dL  32 0-36 0   RDW 14 4 %  11 0-15 0   PLATELET COUNT 099 Thousand/uL  140-400   MPV 7 8 fL  7 5-11 5   ABSOLUTE NEUTROPHILS 2311 cells/uL  5196-7059   ABSOLUTE LYMPHOCYTES 2662 cells/uL  850-3900   ABSOLUTE MONOCYTES 378 cells/uL  200-950   ABSOLUTE EOSINOPHILS 22 cells/uL     ABSOLUTE BASOPHILS 27 cells/uL  0-200   NEUTROPHILS 42 8 %     LYMPHOCYTES 49 3 %     MONOCYTES 7 0 %     EOSINOPHILS 0 4 %     BASOPHILS 0 5 %       (1) COMPREHENSIVE METABOLIC PANEL [id number] 15:18FP Aristeo Courtney     Test Name Result Flag Reference   GLUCOSE 95 mg/dL  65-99   Fasting reference interval   UREA NITROGEN (BUN) 22 mg/dL  7-25   CREATININE 0 56 mg/dL  0 50-1 05   For patients >52years of age, the reference limit  for Creatinine is approximately 13% higher for people  identified as -American  eGFR NON-AFR   AMERICAN 106 mL/min/1 73m2  > OR = 60   eGFR AFRICAN AMERICAN 123 mL/min/1 73m2  > OR = 60   BUN/CREATININE RATIO   7-96   NOT APPLICABLE (calc)   SODIUM 142 mmol/L  135-146   POTASSIUM 4 3 mmol/L  3 5-5 3   CHLORIDE 110 mmol/L     CARBON DIOXIDE 23 mmol/L  19-30   CALCIUM 8 8 mg/dL  8 6-10 4   PROTEIN, TOTAL 7 2 g/dL  6 1-8 1   ALBUMIN 4 1 g/dL  3 6-5 1   GLOBULIN 3 1 g/dL (calc)  1 9-3 7   ALBUMIN/GLOBULIN RATIO 1 3 (calc)  1 0-2 5   BILIRUBIN, TOTAL 0 4 mg/dL  0 2-1 2   ALKALINE PHOSPHATASE 61 U/L     AST 18 U/L  10-35   ALT 19 U/L  6-29     (Q) LIPID PANEL WITH REFLEX TO DIRECT LDL [id number] 06:00AM Aristeo Courtney     Test Name Result Flag Reference   CHOLESTEROL, TOTAL 242 mg/dL H 125-200   HDL CHOLESTEROL 84 mg/dL  > OR = 46   TRIGLICERIDES 52 mg/dL  <659 LDL-CHOLESTEROL 148 mg/dL (calc) H <130   Desirable range <100 mg/dL for patients with CHD or  diabetes and <70 mg/dL for diabetic patients with  known heart disease  CHOL/HDLC RATIO 2 9 (calc)  < OR = 5 0   NON HDL CHOLESTEROL 158 mg/dL (calc)     Target for non-HDL cholesterol is 30 mg/dL higher than   LDL cholesterol target  *(Q) VITAMIN D, 25-HYDROXY, LC/MS/MS [id number] 06:00AM Aristeo Courtney     Test Name Result Flag Reference   VITAMIN D, 25-OH, TOTAL 15 ng/mL L    Vitamin D Status         25-OH Vitamin D:     Deficiency:                    <20 ng/mL  Insufficiency:             20 - 29 ng/mL  Optimal:                 > or = 30 ng/mL     For 25-OH Vitamin D testing on patients on   D2-supplementation and patients for whom quantitation   of D2 and D3 fractions is required, the QuestAssureD(TM)  25-OH VIT D, (D2,D3), LC/MS/MS is recommended: order   code 64458 (patients >2yrs)  For more information on this test, go to:  http://Bluefly/faq/KAL881  (This link is being provided for   informational/educational purposes only )     (Q) TSH, 3RD GENERATION W/REFLEX TO FT4 [id number] 06:00AM Aristeo Courtney     Test Name Result Flag Reference   TSH W/REFLEX TO FT4 1 03 mIU/L     Reference Range                         > or = 20 Years  0 40-4 50                              Pregnancy Ranges            First trimester    0 26-2 66            Second trimester   0 55-2 73            Third trimester    0 43-2 91  NO COLLECTION DATE RECEIVED  WE HAVE USED  THE DATE THE SPECIMEN WAS RECEIVED BY THIS  LABORATORY AS THE COLLECTION DATE  IF THIS  IS INCORRECT, PLEASE CONTACT CLIENT SERVICES    PHONE NUMBER: [phone]

## 2018-01-18 NOTE — RESULT NOTES
Verified Results  * XR KNEE 3 VW RIGHT NON INJURY [id number] 09:00AM Charm Moulding Order Number: [id number]     Test Name Result Flag Reference   XR KNEE 3 VW RIGHT (Report)     RIGHT KNEE     INDICATION: Right knee pain  COMPARISON: 7/30/2013 x-rays     VIEWS: 3     IMAGES: 3     FINDINGS:     There is no acute fracture or dislocation  There is no joint effusion  No degenerative changes  No lytic or blastic lesions are seen  Soft tissues are unremarkable  IMPRESSION:   Normal exam   No acute osseous abnormality  Stable appearance       Workstation performed: [id number]     Signed by:   Mikey Reis MD   3/4/17     * XR HIPS BILATERAL 2 VW W PELVIS IF PERFORMED [id number] 09:00AM Charm Moulding Order Number: [id number]     Test Name Result Flag Reference   XR HIPS BILATERAL WITH AP PELVIS (Report)     BILATERAL HIPS AND PELVIS     INDICATION: 61-year-old female, bilateral hip pain, right knee pain     COMPARISON: None     VIEWS: AP pelvis and coned down views of each hip     IMAGES: 5      FINDINGS:     No acute pelvic fracture or pathologic bone lesions  Visualized bony pelvis appears intact  LEFT HIP:   No significant degenerative changes  Bony alignment is maintained  Soft tissues are unremarkable  RIGHT HIP:   No significant degenerative changes  Bony alignment is maintained  Soft tissues are unremarkable  IMPRESSION:     Unremarkable hips and pelvis         Workstation performed: [id number]     Signed by:   Mikey Reis MD   3/4/17       Plan  Acute pain of right knee, Hip pain, left, Hip pain, right    · *1 - [facility] Physical Therapy  Consult  Status: Active  Requested for:  [id number]  Care Summary provided  : Yes

## 2018-01-22 VITALS
DIASTOLIC BLOOD PRESSURE: 80 MMHG | BODY MASS INDEX: 28.21 KG/M2 | WEIGHT: 159.19 LBS | HEIGHT: 63 IN | SYSTOLIC BLOOD PRESSURE: 118 MMHG | HEART RATE: 69 BPM

## 2018-01-24 VITALS
DIASTOLIC BLOOD PRESSURE: 78 MMHG | BODY MASS INDEX: 28.24 KG/M2 | TEMPERATURE: 97.7 F | HEIGHT: 63 IN | SYSTOLIC BLOOD PRESSURE: 120 MMHG | HEART RATE: 84 BPM | WEIGHT: 159.38 LBS | OXYGEN SATURATION: 97 % | RESPIRATION RATE: 16 BRPM

## 2018-04-22 PROBLEM — R91.1 SOLITARY PULMONARY NODULE: Status: ACTIVE | Noted: 2017-12-06

## 2018-04-23 ENCOUNTER — OFFICE VISIT (OUTPATIENT)
Dept: FAMILY MEDICINE CLINIC | Facility: CLINIC | Age: 58
End: 2018-04-23
Payer: COMMERCIAL

## 2018-04-23 VITALS
BODY MASS INDEX: 29.23 KG/M2 | WEIGHT: 165 LBS | OXYGEN SATURATION: 99 % | SYSTOLIC BLOOD PRESSURE: 128 MMHG | DIASTOLIC BLOOD PRESSURE: 78 MMHG | TEMPERATURE: 97.4 F | HEART RATE: 73 BPM

## 2018-04-23 DIAGNOSIS — M25.50 ARTHRALGIA OF MULTIPLE JOINTS: Primary | ICD-10-CM

## 2018-04-23 LAB
CK SERPL-CCNC: 120 U/L (ref 26–192)
CRP SERPL QL: 7.1 MG/L

## 2018-04-23 PROCEDURE — 86038 ANTINUCLEAR ANTIBODIES: CPT | Performed by: FAMILY MEDICINE

## 2018-04-23 PROCEDURE — 86140 C-REACTIVE PROTEIN: CPT | Performed by: FAMILY MEDICINE

## 2018-04-23 PROCEDURE — 82550 ASSAY OF CK (CPK): CPT | Performed by: FAMILY MEDICINE

## 2018-04-23 PROCEDURE — 99214 OFFICE O/P EST MOD 30 MIN: CPT | Performed by: FAMILY MEDICINE

## 2018-04-23 PROCEDURE — 86618 LYME DISEASE ANTIBODY: CPT | Performed by: FAMILY MEDICINE

## 2018-04-23 PROCEDURE — 3725F SCREEN DEPRESSION PERFORMED: CPT | Performed by: FAMILY MEDICINE

## 2018-04-23 PROCEDURE — 86431 RHEUMATOID FACTOR QUANT: CPT | Performed by: FAMILY MEDICINE

## 2018-04-23 PROCEDURE — 36415 COLL VENOUS BLD VENIPUNCTURE: CPT | Performed by: FAMILY MEDICINE

## 2018-04-23 PROCEDURE — 86430 RHEUMATOID FACTOR TEST QUAL: CPT | Performed by: FAMILY MEDICINE

## 2018-04-23 RX ORDER — B-COMPLEX WITH VITAMIN C
1 TABLET ORAL DAILY
COMMUNITY
End: 2019-01-02

## 2018-04-23 RX ORDER — RIBOFLAVIN (VITAMIN B2) 100 MG
1 TABLET ORAL DAILY
COMMUNITY
End: 2018-04-23 | Stop reason: SDUPTHER

## 2018-04-23 RX ORDER — IBUPROFEN 800 MG/1
1 TABLET ORAL EVERY 8 HOURS
COMMUNITY
Start: 2017-03-07 | End: 2018-05-08 | Stop reason: ALTCHOICE

## 2018-04-23 RX ORDER — MELOXICAM 15 MG/1
15 TABLET ORAL DAILY
Qty: 30 TABLET | Refills: 1 | Status: SHIPPED | OUTPATIENT
Start: 2018-04-23 | End: 2018-05-08 | Stop reason: ALTCHOICE

## 2018-04-23 RX ORDER — AMPICILLIN TRIHYDRATE 250 MG
CAPSULE ORAL
COMMUNITY
Start: 2016-11-29 | End: 2019-01-02

## 2018-04-23 RX ORDER — ALPRAZOLAM 0.5 MG/1
TABLET ORAL 2 TIMES DAILY PRN
COMMUNITY
Start: 2016-08-06

## 2018-04-23 NOTE — ASSESSMENT & PLAN NOTE
Patient with ongoing pain in multiple joints; right knee, bilateral ankles, bilateral hips, low back  Symptoms have been present for months  X-rays of right knee performed March 2017 were essentially unremarkable  Upon examination  Patient does not exhibit any synovitis  Will check arthritis blood work today  Will also give Rx for meloxicam 15 mg daily  Will call with results  Possible referral to Rheumatology

## 2018-04-23 NOTE — PROGRESS NOTES
Assessment/Plan:    Arthralgia of multiple joints  Patient with ongoing pain in multiple joints; right knee, bilateral ankles, bilateral hips, low back  Symptoms have been present for months  X-rays of right knee performed March 2017 were essentially unremarkable  Upon examination  Patient does not exhibit any synovitis  Will check arthritis blood work today  Will also give Rx for meloxicam 15 mg daily  Will call with results  Possible referral to Rheumatology  Diagnoses and all orders for this visit:    Arthralgia of multiple joints  -     CK (with reflex to MB)  -     C-reactive protein  -     Lyme Antibody Profile with reflex to WB  -     DAVI Screen w/ Reflex to Titer/Pattern  -     RF Screen w/ Reflex to Titer  -     meloxicam (MOBIC) 15 mg tablet; Take 1 tablet (15 mg total) by mouth daily          Subjective:      Patient ID: Jeannie Tejada is a 56 y o  female  Pt c/o pain in R knee, b/l ankles, b/l hips, back x mos  Worse in Am, or after periods of inactivity  Taking advil w/o benefit  The following portions of the patient's history were reviewed and updated as appropriate: allergies, current medications, past family history, past medical history, past social history, past surgical history and problem list     Review of Systems   Respiratory: Negative  Cardiovascular: Negative  Gastrointestinal: Negative  Genitourinary: Negative  Musculoskeletal: Positive for back pain and joint swelling  Negative for myalgias           Objective:      /78 (BP Location: Left arm, Patient Position: Sitting)   Pulse 73   Temp (!) 97 4 °F (36 3 °C) (Tympanic)   Wt 74 8 kg (165 lb)   SpO2 99%   BMI 29 23 kg/m²          Physical Exam   Musculoskeletal:   No synovitis appreciated

## 2018-04-24 ENCOUNTER — TELEPHONE (OUTPATIENT)
Dept: FAMILY MEDICINE CLINIC | Facility: CLINIC | Age: 58
End: 2018-04-24

## 2018-04-24 LAB
CRYOGLOB RF SER-ACNC: ABNORMAL [IU]/ML
RHEUMATOID FACT SER QL LA: POSITIVE

## 2018-04-24 NOTE — TELEPHONE ENCOUNTER
I called and spoke to [facility]  she was referred by Fili Guzman to see a rheumatologist Moustapha Mott and she is not in pt's network for her insurance  She is scheduled to see a rheumatologist Dr Casey on 7/31/2018  Dr Casey's office number is [phone] we need to call to get the fax number to send office notes and blood work

## 2018-04-25 LAB
B BURGDOR IGG SER IA-ACNC: 0.28
B BURGDOR IGM SER IA-ACNC: 0.31
RYE IGE QN: NEGATIVE

## 2018-04-26 NOTE — TELEPHONE ENCOUNTER
I called Dr Casey's fax number is [phone]   Pt's most recent office note and blood work along with the x rays that pt had last March of 2017 were faxed to [phone] to attention Dr Casey's office on 4/26/18 at 2:28 pm

## 2018-05-08 ENCOUNTER — OFFICE VISIT (OUTPATIENT)
Dept: FAMILY MEDICINE CLINIC | Facility: CLINIC | Age: 58
End: 2018-05-08
Payer: COMMERCIAL

## 2018-05-08 VITALS
HEIGHT: 62 IN | BODY MASS INDEX: 29.79 KG/M2 | WEIGHT: 161.9 LBS | HEART RATE: 74 BPM | TEMPERATURE: 96.2 F | SYSTOLIC BLOOD PRESSURE: 102 MMHG | DIASTOLIC BLOOD PRESSURE: 74 MMHG | RESPIRATION RATE: 19 BRPM | OXYGEN SATURATION: 98 %

## 2018-05-08 DIAGNOSIS — F41.9 ANXIETY: ICD-10-CM

## 2018-05-08 DIAGNOSIS — M25.50 ARTHRALGIA OF MULTIPLE JOINTS: Primary | ICD-10-CM

## 2018-05-08 PROCEDURE — 99213 OFFICE O/P EST LOW 20 MIN: CPT | Performed by: FAMILY MEDICINE

## 2018-05-08 RX ORDER — PREDNISONE 10 MG/1
TABLET ORAL
Qty: 80 TABLET | Refills: 1 | Status: SHIPPED | OUTPATIENT
Start: 2018-05-08 | End: 2018-07-01 | Stop reason: SDUPTHER

## 2018-05-08 NOTE — PROGRESS NOTES
Assessment/Plan:    Arthralgia of multiple joints  Recheck of ongoing multiple joint arthralgias (especially in ankles, knees, hips, shoulders)  Pain is worse in morning  No rashes  Recent labs revealed +RF and CRP  Pt has appointment w/ rheumatologist on 7/31  She wants to know if there is anything else she can do in the meantime  Will start course of prednisone; 40 mg daily x 5 days, 30 mg daily x 10 days, 20 mg daily x 15 days, then 10 mg daily until her appointment with Rheumatology on July 31st   Patient is instructed to call if any further problems         Diagnoses and all orders for this visit:    Arthralgia of multiple joints  -     predniSONE 10 mg tablet; 4 tabs qd x 5 days, 3 tabs qd x 10 days, 2 tabs qd x 15 days, then 1 tab daily    Anxiety          Subjective:      Patient ID: Bassam Erickson is a 56 y o  female  Recheck of ongoing multiple joint arthralgias (especially in ankles, knees, hips, shoulders)  Pain is worse in morning  No rashes  Recent labs revealed +RF and CRP  Pt has appointment w/ rheumatologist on 7/31  She wants to know if there is anything else she can do in the meantime          The following portions of the patient's history were reviewed and updated as appropriate: allergies, current medications, past family history, past medical history, past social history, past surgical history and problem list     Review of Systems      Objective:      /74 (BP Location: Left arm, Patient Position: Sitting, Cuff Size: Adult)   Pulse 74   Temp (!) 96 2 °F (35 7 °C) (Tympanic)   Resp 19   Ht 5' 2" (1 575 m)   Wt 73 4 kg (161 lb 14 4 oz)   SpO2 98%   BMI 29 61 kg/m²          Physical Exam

## 2018-05-16 ENCOUNTER — TELEPHONE (OUTPATIENT)
Dept: FAMILY MEDICINE CLINIC | Facility: CLINIC | Age: 58
End: 2018-05-16

## 2018-05-16 NOTE — TELEPHONE ENCOUNTER
Please let patient know that her Michell Labrum duty excuse was faxed to [facility] ([phone])  The original is at the  if she would like to  a copy

## 2018-05-31 ENCOUNTER — OFFICE VISIT (OUTPATIENT)
Dept: FAMILY MEDICINE CLINIC | Facility: CLINIC | Age: 58
End: 2018-05-31
Payer: COMMERCIAL

## 2018-05-31 VITALS
TEMPERATURE: 96.4 F | WEIGHT: 161.9 LBS | OXYGEN SATURATION: 97 % | HEART RATE: 76 BPM | DIASTOLIC BLOOD PRESSURE: 76 MMHG | BODY MASS INDEX: 29.61 KG/M2 | SYSTOLIC BLOOD PRESSURE: 122 MMHG

## 2018-05-31 DIAGNOSIS — R31.9 URINARY TRACT INFECTION WITH HEMATURIA, SITE UNSPECIFIED: Primary | ICD-10-CM

## 2018-05-31 DIAGNOSIS — N39.0 URINARY TRACT INFECTION WITH HEMATURIA, SITE UNSPECIFIED: Primary | ICD-10-CM

## 2018-05-31 LAB
SL AMB  POCT GLUCOSE, UA: ABNORMAL
SL AMB LEUKOCYTE ESTERASE,UA: ABNORMAL
SL AMB POCT BILIRUBIN,UA: ABNORMAL
SL AMB POCT BLOOD,UA: ABNORMAL
SL AMB POCT CLARITY,UA: CLEAR
SL AMB POCT COLOR,UA: YELLOW
SL AMB POCT KETONES,UA: ABNORMAL
SL AMB POCT NITRITE,UA: ABNORMAL
SL AMB POCT PH,UA: 5.5
SL AMB POCT SPECIFIC GRAVITY,UA: 1.01
SL AMB POCT URINE PROTEIN: ABNORMAL
SL AMB POCT UROBILINOGEN: 0.2

## 2018-05-31 PROCEDURE — 99214 OFFICE O/P EST MOD 30 MIN: CPT | Performed by: FAMILY MEDICINE

## 2018-05-31 PROCEDURE — 81003 URINALYSIS AUTO W/O SCOPE: CPT | Performed by: FAMILY MEDICINE

## 2018-05-31 RX ORDER — CIPROFLOXACIN 500 MG/1
500 TABLET, FILM COATED ORAL EVERY 12 HOURS SCHEDULED
Qty: 14 TABLET | Refills: 0 | Status: SHIPPED | OUTPATIENT
Start: 2018-05-31 | End: 2018-06-07

## 2018-05-31 NOTE — PROGRESS NOTES
Assessment/Plan:   1  Urinary tract infection with hematuria, site unspecified  Patient's symptoms appear likely secondary to acute UTI  Urine was positive for leukocytes as well as blood  Will send urine for culture  Maintain proper hydration  Start treatment with Cipro 500 mg b i d  for 7 days  If any symptoms should worsen, she was advised to call  - POCT urine dip auto non-scope  - Urine culture  - ciprofloxacin (CIPRO) 500 mg tablet; Take 1 tablet (500 mg total) by mouth every 12 (twelve) hours for 7 days  Dispense: 14 tablet; Refill: 0       Diagnoses and all orders for this visit:    Hematuria, unspecified type  -     POCT urine dip auto non-scope  -     Urine culture          Subjective:    Chief Complaint   Patient presents with    Urinary Tract Infection     pressure in lower abdomen and frequeny urination x 2 days        Patient ID: Usah Benítez is a 56 y o  female  Urinary Tract Infection    This is a new problem  The current episode started yesterday  The problem occurs every urination  The problem has been unchanged  The quality of the pain is described as burning  The pain is mild  There has been no fever  Associated symptoms include flank pain, frequency and urgency  Pertinent negatives include no chills, hematuria or nausea  She has tried nothing for the symptoms  Review of Systems   Constitutional: Negative for activity change, chills, fatigue and fever  HENT: Negative for congestion, ear pain, sinus pressure and sore throat  Eyes: Negative for redness, itching and visual disturbance  Respiratory: Negative for cough and shortness of breath  Cardiovascular: Negative for chest pain and palpitations  Gastrointestinal: Negative for abdominal pain, diarrhea and nausea  Endocrine: Negative for cold intolerance and heat intolerance  Genitourinary: Positive for flank pain, frequency and urgency  Negative for dysuria and hematuria     Musculoskeletal: Negative for arthralgias, back pain, gait problem and myalgias  Skin: Negative for color change  Allergic/Immunologic: Negative for environmental allergies  Neurological: Negative for dizziness, numbness and headaches  Psychiatric/Behavioral: Negative for behavioral problems and sleep disturbance  The following portions of the patient's history were reviewed and updated as appropriate : past family history, past medical history, past social history and past surgical history  Objective:    Vitals:    05/31/18 1027   BP: 122/76   BP Location: Left arm   Patient Position: Sitting   Pulse: 76   Temp: (!) 96 4 °F (35 8 °C)   TempSrc: Tympanic   SpO2: 97%   Weight: 73 4 kg (161 lb 14 4 oz)        Physical Exam   Constitutional: She is oriented to person, place, and time  She appears well-developed and well-nourished  HENT:   Head: Normocephalic and atraumatic  Nose: Nose normal    Mouth/Throat: No oropharyngeal exudate  Eyes: Pupils are equal, round, and reactive to light  Right eye exhibits no discharge  Left eye exhibits no discharge  Neck: Normal range of motion  Neck supple  No tracheal deviation present  Cardiovascular: Normal rate, regular rhythm and intact distal pulses  Exam reveals no gallop and no friction rub  No murmur heard  Pulses:       Dorsalis pedis pulses are 2+ on the right side, and 2+ on the left side  Posterior tibial pulses are 2+ on the right side, and 2+ on the left side  Pulmonary/Chest: Effort normal and breath sounds normal  No respiratory distress  She has no wheezes  She has no rales  Abdominal: Soft  Bowel sounds are normal  She exhibits no distension  There is no tenderness  There is no rebound and no guarding  Musculoskeletal: Normal range of motion  She exhibits no edema  Lymphadenopathy:        Head (right side): No submental and no submandibular adenopathy present  Head (left side): No submental and no submandibular adenopathy present       She has no cervical adenopathy  Right cervical: No superficial cervical, no deep cervical and no posterior cervical adenopathy present  Left cervical: No superficial cervical, no deep cervical and no posterior cervical adenopathy present  Neurological: She is alert and oriented to person, place, and time  No cranial nerve deficit or sensory deficit  Skin: Skin is warm, dry and intact  Psychiatric: Her speech is normal and behavior is normal  Judgment normal  Her mood appears not anxious  Cognition and memory are normal  She does not exhibit a depressed mood  Vitals reviewed

## 2018-07-01 DIAGNOSIS — M25.50 ARTHRALGIA OF MULTIPLE JOINTS: ICD-10-CM

## 2018-07-01 RX ORDER — PREDNISONE 10 MG/1
TABLET ORAL
Qty: 80 TABLET | Refills: 1 | Status: SHIPPED | OUTPATIENT
Start: 2018-07-01 | End: 2018-11-20 | Stop reason: ALTCHOICE

## 2018-10-10 ENCOUNTER — TELEPHONE (OUTPATIENT)
Dept: FAMILY MEDICINE CLINIC | Facility: CLINIC | Age: 58
End: 2018-10-10

## 2018-10-10 NOTE — TELEPHONE ENCOUNTER
Pt came into the office and dropped off her disability placard form for you to fill out  Placed in follow up folder  Thank you!

## 2018-10-11 ENCOUNTER — TELEPHONE (OUTPATIENT)
Dept: FAMILY MEDICINE CLINIC | Facility: CLINIC | Age: 58
End: 2018-10-11

## 2018-10-11 NOTE — TELEPHONE ENCOUNTER
----- Message from Harrsi Pascual sent at 10/11/2018  1:13 PM EDT -----  Regarding: Form  Please let patient know her Handicap [employer] is at the  to

## 2018-11-20 ENCOUNTER — TRANSCRIBE ORDERS (OUTPATIENT)
Dept: ADMINISTRATIVE | Facility: HOSPITAL | Age: 58
End: 2018-11-20

## 2018-11-20 ENCOUNTER — OFFICE VISIT (OUTPATIENT)
Dept: FAMILY MEDICINE CLINIC | Facility: CLINIC | Age: 58
End: 2018-11-20
Payer: COMMERCIAL

## 2018-11-20 VITALS
OXYGEN SATURATION: 99 % | HEIGHT: 62 IN | HEART RATE: 74 BPM | RESPIRATION RATE: 18 BRPM | SYSTOLIC BLOOD PRESSURE: 124 MMHG | TEMPERATURE: 96.4 F | DIASTOLIC BLOOD PRESSURE: 80 MMHG | BODY MASS INDEX: 29.76 KG/M2 | WEIGHT: 161.7 LBS

## 2018-11-20 DIAGNOSIS — Z00.00 WELL ADULT EXAM: Primary | ICD-10-CM

## 2018-11-20 DIAGNOSIS — E55.9 VITAMIN D DEFICIENCY: ICD-10-CM

## 2018-11-20 DIAGNOSIS — Z12.39 SCREENING FOR BREAST CANCER: ICD-10-CM

## 2018-11-20 DIAGNOSIS — M76.61 TENDONITIS, ACHILLES, RIGHT: Primary | ICD-10-CM

## 2018-11-20 DIAGNOSIS — IMO0001 TEAR OF RIGHT SUPRASPINATUS TENDON, INITIAL ENCOUNTER: ICD-10-CM

## 2018-11-20 DIAGNOSIS — Z11.59 ENCOUNTER FOR HEPATITIS C SCREENING TEST FOR LOW RISK PATIENT: ICD-10-CM

## 2018-11-20 DIAGNOSIS — F41.9 ANXIETY: ICD-10-CM

## 2018-11-20 DIAGNOSIS — E78.2 MIXED HYPERLIPIDEMIA: ICD-10-CM

## 2018-11-20 PROBLEM — M75.101 TEAR OF RIGHT SUPRASPINATUS TENDON: Status: ACTIVE | Noted: 2018-11-20

## 2018-11-20 PROCEDURE — 3008F BODY MASS INDEX DOCD: CPT | Performed by: FAMILY MEDICINE

## 2018-11-20 PROCEDURE — 99214 OFFICE O/P EST MOD 30 MIN: CPT | Performed by: FAMILY MEDICINE

## 2018-11-20 PROCEDURE — 1036F TOBACCO NON-USER: CPT | Performed by: FAMILY MEDICINE

## 2018-11-20 RX ORDER — CELECOXIB 200 MG/1
200 CAPSULE ORAL DAILY
COMMUNITY

## 2018-11-20 NOTE — ASSESSMENT & PLAN NOTE
Pt has 1 month hx of R shoulder pain  Recent MRI from 11/15 shows supraspinatus tear  She will be seeing ortho (dr Trudi Lynn) in near future

## 2018-11-20 NOTE — PROGRESS NOTES
Assessment/Plan:    Tear of right supraspinatus tendon  Pt has 1 month hx of R shoulder pain  Recent MRI from 11/15 shows supraspinatus tear  She will be seeing ortho (dr Girtha Boxer) in near future  Well adult exam   Patient presents for 24-year-old physical examination  And 6 month med check today  Overall she is doing pretty well  Her examination is essentially negative today  Will check fasting blood work today in office ( to include hep C antibody)  Patient refuses flu shot  She is on [employer] list   Rx given for mammography  Patient is current with DEXA and colonoscopy  Will call with lab results    Anxiety   Doing well on rare usage of alprazolam   No side effects or falls    Hyperlipidemia   Patient no longer taking red yeast rice  Will check lipid panel today  Will call with results  Recommend low-fat diet and exercise    Vitamin D deficiency   Continue OTC supplement       Diagnoses and all orders for this visit:    Well adult exam    Anxiety  -     CBC and differential  -     TSH, 3rd generation with Free T4 reflex    Mixed hyperlipidemia  -     Comprehensive metabolic panel  -     Lipid Panel with Direct LDL reflex    Vitamin D deficiency    Screening for breast cancer  -     Mammo screening bilateral w 3d & cad; Future    Tear of right supraspinatus tendon, initial encounter    Encounter for hepatitis C screening test for low risk patient  -     Hepatitis C antibody    Other orders  -     celecoxib (CeleBREX) 200 mg capsule; Take 200 mg by mouth daily       WILL CALL WITH YEARLY LAB RESULTS     6 MONTHS, MED CHECK    Subjective:      Patient ID: Joe Ayala is a 66 y o  female  Patient presents for 66year-old physical examination  And 6 month med check today  Overall she is doing pretty well  She has been having some problems with the right shoulder, for which she has a rotator cuff tear  She also has ongoing arthralgias, for which she was started on Celebrex    She  Still takes Xanax, but only sparingly  No other problems today        The following portions of the patient's history were reviewed and updated as appropriate: allergies, current medications, past family history, past medical history, past social history, past surgical history and problem list     Review of Systems   Respiratory: Negative  Cardiovascular: Negative  Gastrointestinal: Negative  Genitourinary: Negative  Objective:      /80 (BP Location: Left arm, Patient Position: Sitting, Cuff Size: Adult)   Pulse 74   Temp (!) 96 4 °F (35 8 °C) (Tympanic)   Resp 18   Ht 5' 2" (1 575 m)   Wt 73 3 kg (161 lb 11 2 oz)   SpO2 99%   BMI 29 58 kg/m²          Physical Exam   Cardiovascular: Normal rate, regular rhythm, normal heart sounds and intact distal pulses  Carotids: no bruits  Ext: no edema   Pulmonary/Chest: Effort normal  No respiratory distress  She has no wheezes  She has no rales  Psychiatric: She has a normal mood and affect   Her behavior is normal  Thought content normal

## 2018-11-20 NOTE — ASSESSMENT & PLAN NOTE
Patient no longer taking red yeast rice  Will check lipid panel today  Will call with results    Recommend low-fat diet and exercise

## 2018-11-20 NOTE — ASSESSMENT & PLAN NOTE
Patient presents for 66year-old physical examination  And 6 month med check today  Overall she is doing pretty well  Her examination is essentially negative today  Will check fasting blood work today in office ( to include hep C antibody)  Patient refuses flu shot  She is on [employer] list   Rx given for mammography  Patient is current with DEXA and colonoscopy    Will call with lab results

## 2018-11-21 LAB
ALBUMIN SERPL-MCNC: 4.3 G/DL (ref 3.6–5.1)
ALBUMIN/GLOB SERPL: 1.4 (CALC) (ref 1–2.5)
ALP SERPL-CCNC: 76 U/L (ref 33–130)
ALT SERPL-CCNC: 25 U/L (ref 6–29)
AST SERPL-CCNC: 21 U/L (ref 10–35)
BASOPHILS # BLD AUTO: 31 CELLS/UL (ref 0–200)
BASOPHILS NFR BLD AUTO: 0.6 %
BILIRUB SERPL-MCNC: 0.5 MG/DL (ref 0.2–1.2)
BUN SERPL-MCNC: 21 MG/DL (ref 7–25)
BUN/CREAT SERPL: NORMAL (CALC) (ref 6–22)
CALCIUM SERPL-MCNC: 9.3 MG/DL (ref 8.6–10.4)
CHLORIDE SERPL-SCNC: 103 MMOL/L (ref 98–110)
CHOLEST SERPL-MCNC: 244 MG/DL
CHOLEST/HDLC SERPL: 3.2 (CALC)
CO2 SERPL-SCNC: 27 MMOL/L (ref 20–32)
CREAT SERPL-MCNC: 0.6 MG/DL (ref 0.5–1.05)
EOSINOPHIL # BLD AUTO: 31 CELLS/UL (ref 15–500)
EOSINOPHIL NFR BLD AUTO: 0.6 %
ERYTHROCYTE [DISTWIDTH] IN BLOOD BY AUTOMATED COUNT: 12.9 % (ref 11–15)
GLOBULIN SER CALC-MCNC: 3 G/DL (CALC) (ref 1.9–3.7)
GLUCOSE SERPL-MCNC: 83 MG/DL (ref 65–99)
HCT VFR BLD AUTO: 41.5 % (ref 35–45)
HCV AB S/CO SERPL IA: 0.03
HCV AB SERPL QL IA: NORMAL
HDLC SERPL-MCNC: 77 MG/DL
HGB BLD-MCNC: 14.1 G/DL (ref 11.7–15.5)
LDLC SERPL CALC-MCNC: 152 MG/DL (CALC)
LYMPHOCYTES # BLD AUTO: 2387 CELLS/UL (ref 850–3900)
LYMPHOCYTES NFR BLD AUTO: 46.8 %
MCH RBC QN AUTO: 28.7 PG (ref 27–33)
MCHC RBC AUTO-ENTMCNC: 34 G/DL (ref 32–36)
MCV RBC AUTO: 84.5 FL (ref 80–100)
MONOCYTES # BLD AUTO: 377 CELLS/UL (ref 200–950)
MONOCYTES NFR BLD AUTO: 7.4 %
NEUTROPHILS # BLD AUTO: 2275 CELLS/UL (ref 1500–7800)
NEUTROPHILS NFR BLD AUTO: 44.6 %
NONHDLC SERPL-MCNC: 167 MG/DL (CALC)
PLATELET # BLD AUTO: 333 THOUSAND/UL (ref 140–400)
PMV BLD REES-ECKER: 9.4 FL (ref 7.5–12.5)
POTASSIUM SERPL-SCNC: 4.1 MMOL/L (ref 3.5–5.3)
PROT SERPL-MCNC: 7.3 G/DL (ref 6.1–8.1)
RBC # BLD AUTO: 4.91 MILLION/UL (ref 3.8–5.1)
SL AMB EGFR AFRICAN AMERICAN: 118 ML/MIN/1.73M2
SL AMB EGFR NON AFRICAN AMERICAN: 102 ML/MIN/1.73M2
SODIUM SERPL-SCNC: 139 MMOL/L (ref 135–146)
TRIGL SERPL-MCNC: 54 MG/DL
TSH SERPL-ACNC: 1.24 MIU/L (ref 0.4–4.5)
WBC # BLD AUTO: 5.1 THOUSAND/UL (ref 3.8–10.8)

## 2018-12-03 ENCOUNTER — HOSPITAL ENCOUNTER (OUTPATIENT)
Dept: MRI IMAGING | Facility: HOSPITAL | Age: 58
Discharge: HOME | End: 2018-12-03
Attending: PODIATRIST
Payer: COMMERCIAL

## 2018-12-03 DIAGNOSIS — M76.61 TENDONITIS, ACHILLES, RIGHT: ICD-10-CM

## 2018-12-03 PROCEDURE — 73721 MRI JNT OF LWR EXTRE W/O DYE: CPT

## 2018-12-18 ENCOUNTER — OFFICE VISIT (OUTPATIENT)
Dept: FAMILY MEDICINE CLINIC | Facility: CLINIC | Age: 58
End: 2018-12-18
Payer: COMMERCIAL

## 2018-12-18 VITALS
DIASTOLIC BLOOD PRESSURE: 80 MMHG | HEIGHT: 62 IN | BODY MASS INDEX: 30 KG/M2 | TEMPERATURE: 97.5 F | WEIGHT: 163 LBS | OXYGEN SATURATION: 99 % | RESPIRATION RATE: 16 BRPM | HEART RATE: 71 BPM | SYSTOLIC BLOOD PRESSURE: 120 MMHG

## 2018-12-18 DIAGNOSIS — Z01.810 PREOP CARDIOVASCULAR EXAM: Primary | ICD-10-CM

## 2018-12-18 DIAGNOSIS — M77.31 HEEL SPUR, RIGHT: ICD-10-CM

## 2018-12-18 PROCEDURE — 99243 OFF/OP CNSLTJ NEW/EST LOW 30: CPT | Performed by: FAMILY MEDICINE

## 2018-12-18 NOTE — PROGRESS NOTES
Assessment/Plan:   1  Preop cardiovascular exam/Heel spur, right  Patient appears well today  She has a good functional capacity and no active cardiac problems  Reviewed her preop testing  EKG appears stable today  This type of procedure is considered low to intermediate risk  Patient is cleared with intermediate perioperative cardiovascular risk  No further testing needed today  Diagnoses and all orders for this visit:    Preop cardiovascular exam    Heel spur, right          Subjective:    Chief Complaint   Patient presents with    Pre-op Exam     right foot surgery Dr Flores 11/20        Patient ID: Luis Fernando Bullock is a 66 y o  female  Luis Fernando Bullock  66 y o   female    SURGEON: Dr Akhil Xiao: Heel spur    DATE OF SURGERY: 1/8/19    PRIOR ANESTHESIA:no    COMPLICATION: no    BLEEDING PROBLEM: no    PERTINENT PMH: no    EXERCISE CAPACITY:   CAN WALK 4 BLOCKS AND OR CLIMB 2 FLIGHTS: Yes    HOME LIVING SITUATION SAFE AND SECURE: Yes      TOBACCO: no     ETOH: yes     ILLEGAL DRUGS: no          Review of Systems      The following portions of the patient's history were reviewed and updated as appropriate : past family history, past medical history, past social history and past surgical history        Current Outpatient Prescriptions:     ALPRAZolam (XANAX) 0 5 mg tablet, Take by mouth 2 (two) times a day as needed, Disp: , Rfl:     Ascorbic Acid (VITAMIN C) 100 MG tablet, Take 100 mg by mouth daily, Disp: , Rfl:     b complex vitamins tablet, Take 1 tablet by mouth daily, Disp: , Rfl:     celecoxib (CeleBREX) 200 mg capsule, Take 200 mg by mouth daily, Disp: , Rfl:     cholecalciferol (VITAMIN D3) 1,000 units tablet, Take 1 tablet by mouth daily, Disp: , Rfl:     ergocalciferol (VITAMIN D2) 50,000 units, Take 1,000 Units by mouth daily  , Disp: , Rfl:     Multiple Vitamin (MULTIVITAMIN) tablet, Take 1 tablet by mouth daily, Disp: , Rfl:     Red Yeast Rice 600 MG CAPS, Take by mouth, Disp: , Rfl:     VITAMIN B COMPLEX-C CAPS, Take 1 capsule by mouth daily, Disp: , Rfl:     Objective:    Vitals:    12/18/18 1222   BP: 120/80   BP Location: Left arm   Patient Position: Sitting   Cuff Size: Adult   Pulse: 71   Resp: 16   Temp: 97 5 °F (36 4 °C)   TempSrc: Tympanic   SpO2: 99%   Weight: 73 9 kg (163 lb)   Height: 5' 2 21" (1 58 m)        Physical Exam   Constitutional: She is oriented to person, place, and time  She appears well-developed and well-nourished  HENT:   Head: Normocephalic and atraumatic  Nose: Nose normal    Mouth/Throat: No oropharyngeal exudate  Eyes: Pupils are equal, round, and reactive to light  Right eye exhibits no discharge  Left eye exhibits no discharge  Neck: Normal range of motion  Neck supple  No tracheal deviation present  Cardiovascular: Normal rate, regular rhythm and intact distal pulses  Exam reveals no gallop and no friction rub  No murmur heard  Pulses:       Dorsalis pedis pulses are 2+ on the right side, and 2+ on the left side  Posterior tibial pulses are 2+ on the right side, and 2+ on the left side  Pulmonary/Chest: Effort normal and breath sounds normal  No respiratory distress  She has no wheezes  She has no rales  Abdominal: Soft  Bowel sounds are normal  She exhibits no distension  There is no tenderness  There is no rebound and no guarding  Musculoskeletal: Normal range of motion  She exhibits no edema  Lymphadenopathy:        Head (right side): No submental and no submandibular adenopathy present  Head (left side): No submental and no submandibular adenopathy present  She has no cervical adenopathy  Right cervical: No superficial cervical, no deep cervical and no posterior cervical adenopathy present  Left cervical: No superficial cervical, no deep cervical and no posterior cervical adenopathy present  Neurological: She is alert and oriented to person, place, and time   No cranial nerve deficit or sensory deficit  Skin: Skin is warm, dry and intact  Psychiatric: Her speech is normal and behavior is normal  Judgment normal  Her mood appears not anxious  Cognition and memory are normal  She does not exhibit a depressed mood  Vitals reviewed

## 2018-12-21 ENCOUNTER — HOSPITAL ENCOUNTER (OUTPATIENT)
Dept: RADIOLOGY | Age: 58
Discharge: HOME | End: 2018-12-21
Payer: COMMERCIAL

## 2018-12-21 VITALS — HEIGHT: 63 IN | WEIGHT: 160 LBS | BODY MASS INDEX: 28.35 KG/M2

## 2018-12-21 DIAGNOSIS — Z12.39 SCREENING FOR BREAST CANCER: ICD-10-CM

## 2018-12-21 PROCEDURE — 77063 BREAST TOMOSYNTHESIS BI: CPT

## 2018-12-21 PROCEDURE — 77067 SCR MAMMO BI INCL CAD: CPT

## 2019-01-02 ENCOUNTER — APPOINTMENT (OUTPATIENT)
Dept: PREADMISSION TESTING | Facility: HOSPITAL | Age: 59
End: 2019-01-02
Payer: COMMERCIAL

## 2019-01-02 DIAGNOSIS — Z01.818 PREOP TESTING: Primary | ICD-10-CM

## 2019-01-02 LAB
ANION GAP SERPL CALCULATED.3IONS-SCNC: 5 MMOL/L (ref 5–14)
BASOPHILS # BLD AUTO: 0 THOUSANDS/ΜL (ref 0–0.1)
BASOPHILS NFR BLD AUTO: 0 % (ref 0–1)
BUN SERPL-MCNC: 19 MG/DL (ref 5–25)
CALCIUM SERPL-MCNC: 9.5 MG/DL (ref 8.4–10.2)
CHLORIDE SERPL-SCNC: 105 MMOL/L (ref 97–108)
CO2 SERPL-SCNC: 27 MMOL/L (ref 22–30)
CREAT SERPL-MCNC: 0.51 MG/DL (ref 0.6–1.2)
EOSINOPHIL # BLD AUTO: 0 THOUSAND/ΜL (ref 0–0.4)
EOSINOPHIL NFR BLD AUTO: 0 % (ref 0–6)
ERYTHROCYTE [DISTWIDTH] IN BLOOD BY AUTOMATED COUNT: 13.9 %
GFR SERPL CREATININE-BSD FRML MDRD: 108 ML/MIN/1.73SQ M
GLUCOSE SERPL-MCNC: 102 MG/DL (ref 70–99)
HCT VFR BLD AUTO: 40.8 % (ref 36–46)
HGB BLD-MCNC: 13.4 G/DL (ref 12–16)
LYMPHOCYTES # BLD AUTO: 2.5 THOUSANDS/ΜL (ref 0.5–4)
LYMPHOCYTES NFR BLD AUTO: 37 % (ref 25–45)
MCH RBC QN AUTO: 28.7 PG (ref 26–34)
MCHC RBC AUTO-ENTMCNC: 32.9 G/DL (ref 31–36)
MCV RBC AUTO: 88 FL (ref 80–100)
MONOCYTES # BLD AUTO: 0.4 THOUSAND/ΜL (ref 0.2–0.9)
MONOCYTES NFR BLD AUTO: 6 % (ref 1–10)
NEUTROPHILS # BLD AUTO: 3.7 THOUSANDS/ΜL (ref 1.8–7.8)
NEUTS SEG NFR BLD AUTO: 56 % (ref 45–65)
PLATELET # BLD AUTO: 329 THOUSANDS/UL (ref 150–450)
PMV BLD AUTO: 7.3 FL (ref 8.9–12.7)
POTASSIUM SERPL-SCNC: 4.1 MMOL/L (ref 3.6–5)
RBC # BLD AUTO: 4.67 MILLION/UL (ref 4–5.2)
SODIUM SERPL-SCNC: 137 MMOL/L (ref 137–147)
WBC # BLD AUTO: 6.6 THOUSAND/UL (ref 4.5–11)

## 2019-01-02 PROCEDURE — 85025 COMPLETE CBC W/AUTO DIFF WBC: CPT

## 2019-01-02 PROCEDURE — 80048 BASIC METABOLIC PNL TOTAL CA: CPT

## 2019-01-02 RX ORDER — PHENOL 1.4 %
600 AEROSOL, SPRAY (ML) MUCOUS MEMBRANE 2 TIMES DAILY WITH MEALS
COMMUNITY

## 2019-01-02 NOTE — PRE-PROCEDURE INSTRUCTIONS
Pre-op Showering Instructions for Surgery Patients    Before your operation, you play an important role in decreasing your risk for infection by washing with special antiseptic soap  This is an effective way to reduce bacteria on the skin which may help to prevent infections at the surgical site  Please read the following directions in advance  1  In the week before your operation, purchase a 4 ounce bottle of antiseptic soap containing chlorhexidine gluconate (CHG)  4%  Some brand names include: Aplicare®, Endure, and Hibiclens®  The cost is usually less than $5 00   For your convenience, the [employer] carries the soap   It may also be available at your doctors office or pre-admission testing center, and at most retail pharmacies   If you are allergic or sensitive to soaps containing CHG, please let your doctor know so another antiseptic can be suggested   CHG antiseptic soap is for external use only  2  The day before your operation, follow these instructions carefully to get ready   Please clean linens (sheets) on your bed; you should sleep on clean sheets after your evening shower   Get clean towels and washcloth ready - you need enough for 2 showers   Set aside clean underwear, pajamas, and clothing to wear after the showers     Reminders:   DO NOT use any other soap or body rinse on your skin during or after the antiseptic showers   DO NOT use lotion, powder, deodorant, or perfume/aftershave of any kind on your skin after your antiseptic shower   DO NOT shave any body parts in the 24 hours/day before your operation   DO NOT get the antiseptic soap in your eyes, ears, nose, mouth, or vaginal area    3  You will need to shower the night before AND the morning of your surgery  Shower 1:   The first evening before the operation, take the first shower   First, shampoo your hair with regular shampoo and rinse it completely before you use the antiseptic soap   After washing and rinsing your hair, rinse your body   Next, use a clean washcloth to apply the antiseptic soap and wash your body from the neck down to your toes using ½ bottle of the antiseptic soap  You will use the other ½ bottle for the second shower   Clean the area where your incision will be; lather this area well for about 2 minutes   If you are having head or neck surgery, wash areas with the antiseptic soap   Rinse yourself completely with running water   Use a clean towel to dry off   Wear clean underwear and clothing/pajamas  Shower 2   The morning of your operation, take the second shower following the same steps as Shower 1 using the second ½ of the bottle of antiseptic soap   Use clean cloths and towels to wash and dry yourself   Wear clean underwear and clothing    No outpatient prescriptions have been marked as taking for the 1/8/19 encounter [facility] Encounter)

## 2019-01-07 ENCOUNTER — ANESTHESIA EVENT (OUTPATIENT)
Dept: PERIOP | Facility: HOSPITAL | Age: 59
End: 2019-01-07
Payer: COMMERCIAL

## 2019-01-07 NOTE — ANESTHESIA PREPROCEDURE EVALUATION
Review of Systems/Medical History  Patient summary reviewed  Chart reviewed  No history of anesthetic complications     Cardiovascular  EKG reviewed, Negative cardio ROS Exercise tolerance (METS): >4,  Hyperlipidemia,   Comment: ECHO fine ,  Pulmonary  Not a smoker , No asthma , No sleep apnea ,        GI/Hepatic  Negative GI/hepatic ROS          Negative  ROS        Endo/Other  Negative endo/other ROS      GYN  Not currently pregnant , Prior pregnancy/OB history : 2 ,          Hematology  Negative hematology ROS      Musculoskeletal    Arthritis     Neurology  Negative neurology ROS      Psychology   Anxiety,              Physical Exam    Airway    Mallampati score: II  TM Distance: >3 FB  Neck ROM: full     Dental       Cardiovascular  Comment: Negative ROS, Cardiovascular exam normal    Pulmonary      Other Findings  Fixed upper and lower teeth and in good repair      Anesthesia Plan  ASA Score- 2     Anesthesia Type- general with ASA Monitors  Additional Monitors:   Airway Plan: ETT  Comment: Right popliteal block requested per podiatrist      Plan Factors-Patient not instructed to abstain from smoking on day of procedure  Patient did not smoke on day of surgery  Induction- intravenous  Postoperative Plan- Plan for postoperative opioid use  Informed Consent- Anesthetic plan and risks discussed with patient and spouse  I personally reviewed this patient with the CRNA  Discussed and agreed on the Anesthesia Plan with the CRNA  Holger Marinelli

## 2019-01-08 ENCOUNTER — ANESTHESIA (OUTPATIENT)
Dept: PERIOP | Facility: HOSPITAL | Age: 59
End: 2019-01-08
Payer: COMMERCIAL

## 2019-01-08 ENCOUNTER — APPOINTMENT (OUTPATIENT)
Dept: RADIOLOGY | Facility: HOSPITAL | Age: 59
End: 2019-01-08
Payer: COMMERCIAL

## 2019-01-08 ENCOUNTER — HOSPITAL ENCOUNTER (OUTPATIENT)
Facility: HOSPITAL | Age: 59
Setting detail: OUTPATIENT SURGERY
Discharge: HOME | End: 2019-01-08
Attending: PODIATRIST | Admitting: PODIATRIST
Payer: COMMERCIAL

## 2019-01-08 VITALS
DIASTOLIC BLOOD PRESSURE: 77 MMHG | TEMPERATURE: 97.5 F | SYSTOLIC BLOOD PRESSURE: 133 MMHG | OXYGEN SATURATION: 99 % | HEART RATE: 65 BPM | RESPIRATION RATE: 18 BRPM

## 2019-01-08 DIAGNOSIS — Z98.890 POST-OPERATIVE STATE: Primary | ICD-10-CM

## 2019-01-08 DIAGNOSIS — Z01.818 PREOP TESTING: ICD-10-CM

## 2019-01-08 PROCEDURE — 73630 X-RAY EXAM OF FOOT: CPT

## 2019-01-08 PROCEDURE — C1713 ANCHOR/SCREW BN/BN,TIS/BN: HCPCS | Performed by: PODIATRIST

## 2019-01-08 PROCEDURE — 97116 GAIT TRAINING THERAPY: CPT

## 2019-01-08 DEVICE — 5.5MM STRYKER REELX STT ANCHOR
Type: IMPLANTABLE DEVICE | Site: HEEL | Status: FUNCTIONAL
Brand: REELX

## 2019-01-08 DEVICE — SONICANCHOR KIT
Type: IMPLANTABLE DEVICE | Site: HEEL | Status: FUNCTIONAL
Brand: SONICANCHOR

## 2019-01-08 RX ORDER — MAGNESIUM HYDROXIDE 1200 MG/15ML
LIQUID ORAL AS NEEDED
Status: DISCONTINUED | OUTPATIENT
Start: 2019-01-08 | End: 2019-01-08 | Stop reason: HOSPADM

## 2019-01-08 RX ORDER — ROCURONIUM BROMIDE 10 MG/ML
INJECTION, SOLUTION INTRAVENOUS AS NEEDED
Status: DISCONTINUED | OUTPATIENT
Start: 2019-01-08 | End: 2019-01-08 | Stop reason: SURG

## 2019-01-08 RX ORDER — DEXAMETHASONE SODIUM PHOSPHATE 4 MG/ML
4 INJECTION, SOLUTION INTRA-ARTICULAR; INTRALESIONAL; INTRAMUSCULAR; INTRAVENOUS; SOFT TISSUE ONCE AS NEEDED
Status: DISCONTINUED | OUTPATIENT
Start: 2019-01-08 | End: 2019-01-08 | Stop reason: HOSPADM

## 2019-01-08 RX ORDER — SODIUM CHLORIDE, SODIUM LACTATE, POTASSIUM CHLORIDE, CALCIUM CHLORIDE 600; 310; 30; 20 MG/100ML; MG/100ML; MG/100ML; MG/100ML
75 INJECTION, SOLUTION INTRAVENOUS CONTINUOUS
Status: DISCONTINUED | OUTPATIENT
Start: 2019-01-08 | End: 2019-01-08 | Stop reason: HOSPADM

## 2019-01-08 RX ORDER — FENTANYL CITRATE/PF 50 MCG/ML
25 SYRINGE (ML) INJECTION
Status: DISCONTINUED | OUTPATIENT
Start: 2019-01-08 | End: 2019-01-08 | Stop reason: HOSPADM

## 2019-01-08 RX ORDER — CEFAZOLIN SODIUM 2 G/50ML
2000 SOLUTION INTRAVENOUS ONCE
Status: COMPLETED | OUTPATIENT
Start: 2019-01-08 | End: 2019-01-08

## 2019-01-08 RX ORDER — DIPHENHYDRAMINE HYDROCHLORIDE 50 MG/ML
12.5 INJECTION INTRAMUSCULAR; INTRAVENOUS ONCE
Status: DISCONTINUED | OUTPATIENT
Start: 2019-01-08 | End: 2019-01-08 | Stop reason: HOSPADM

## 2019-01-08 RX ORDER — PROPOFOL 10 MG/ML
INJECTION, EMULSION INTRAVENOUS AS NEEDED
Status: DISCONTINUED | OUTPATIENT
Start: 2019-01-08 | End: 2019-01-08 | Stop reason: SURG

## 2019-01-08 RX ORDER — OXYCODONE HYDROCHLORIDE AND ACETAMINOPHEN 5; 325 MG/1; MG/1
1 TABLET ORAL EVERY 4 HOURS PRN
Status: DISCONTINUED | OUTPATIENT
Start: 2019-01-08 | End: 2019-01-08 | Stop reason: HOSPADM

## 2019-01-08 RX ORDER — FENTANYL CITRATE/PF 50 MCG/ML
12.5 SYRINGE (ML) INJECTION
Status: DISCONTINUED | OUTPATIENT
Start: 2019-01-08 | End: 2019-01-08 | Stop reason: HOSPADM

## 2019-01-08 RX ORDER — BUPIVACAINE HYDROCHLORIDE 5 MG/ML
INJECTION, SOLUTION PERINEURAL AS NEEDED
Status: DISCONTINUED | OUTPATIENT
Start: 2019-01-08 | End: 2019-01-08 | Stop reason: HOSPADM

## 2019-01-08 RX ORDER — MIDAZOLAM HYDROCHLORIDE 1 MG/ML
INJECTION INTRAMUSCULAR; INTRAVENOUS AS NEEDED
Status: DISCONTINUED | OUTPATIENT
Start: 2019-01-08 | End: 2019-01-08 | Stop reason: SURG

## 2019-01-08 RX ORDER — LIDOCAINE HYDROCHLORIDE 10 MG/ML
INJECTION, SOLUTION INFILTRATION; PERINEURAL AS NEEDED
Status: DISCONTINUED | OUTPATIENT
Start: 2019-01-08 | End: 2019-01-08 | Stop reason: SURG

## 2019-01-08 RX ORDER — ONDANSETRON 2 MG/ML
INJECTION INTRAMUSCULAR; INTRAVENOUS AS NEEDED
Status: DISCONTINUED | OUTPATIENT
Start: 2019-01-08 | End: 2019-01-08 | Stop reason: SURG

## 2019-01-08 RX ORDER — FENTANYL CITRATE 50 UG/ML
INJECTION, SOLUTION INTRAMUSCULAR; INTRAVENOUS AS NEEDED
Status: DISCONTINUED | OUTPATIENT
Start: 2019-01-08 | End: 2019-01-08 | Stop reason: SURG

## 2019-01-08 RX ADMIN — SUGAMMADEX 200 MG: 100 INJECTION, SOLUTION INTRAVENOUS at 14:01

## 2019-01-08 RX ADMIN — MIDAZOLAM HYDROCHLORIDE 2 MG: 1 INJECTION, SOLUTION INTRAMUSCULAR; INTRAVENOUS at 12:29

## 2019-01-08 RX ADMIN — PROPOFOL 140 MG: 10 INJECTION, EMULSION INTRAVENOUS at 12:39

## 2019-01-08 RX ADMIN — CEFAZOLIN SODIUM 2000 MG: 2 SOLUTION INTRAVENOUS at 12:55

## 2019-01-08 RX ADMIN — LIDOCAINE HYDROCHLORIDE 50 MG: 10 INJECTION, SOLUTION INFILTRATION; PERINEURAL at 12:39

## 2019-01-08 RX ADMIN — ONDANSETRON HYDROCHLORIDE 4 MG: 2 INJECTION, SOLUTION INTRAMUSCULAR; INTRAVENOUS at 13:45

## 2019-01-08 RX ADMIN — SODIUM CHLORIDE, SODIUM LACTATE, POTASSIUM CHLORIDE, AND CALCIUM CHLORIDE: .6; .31; .03; .02 INJECTION, SOLUTION INTRAVENOUS at 12:29

## 2019-01-08 RX ADMIN — FENTANYL CITRATE 50 MCG: 50 INJECTION INTRAMUSCULAR; INTRAVENOUS at 12:29

## 2019-01-08 RX ADMIN — SODIUM CHLORIDE, SODIUM LACTATE, POTASSIUM CHLORIDE, AND CALCIUM CHLORIDE: .6; .31; .03; .02 INJECTION, SOLUTION INTRAVENOUS at 13:59

## 2019-01-08 RX ADMIN — ROCURONIUM BROMIDE 10 MG: 10 INJECTION INTRAVENOUS at 13:32

## 2019-01-08 RX ADMIN — PROPOFOL 60 MG: 10 INJECTION, EMULSION INTRAVENOUS at 14:04

## 2019-01-08 RX ADMIN — ROCURONIUM BROMIDE 35 MG: 10 INJECTION INTRAVENOUS at 12:39

## 2019-01-08 NOTE — PERIOPERATIVE NURSING NOTE
Returned from pacu sleepy but arousable  ivs running  Denies any pain  Dressing dry and intact with half splint intact  Eating and drinking  Awaiting walker

## 2019-01-08 NOTE — ANESTHESIA POSTPROCEDURE EVALUATION
Post-Op Assessment Note      CV Status:  Stable    Mental Status:  Alert    Hydration Status:  Stable    PONV Controlled:  Controlled    Airway Patency:  Patent    Post Op Vitals Reviewed: Yes          Staff: FERMIN           /64 (01/08/19 1416)    Temp (!) 97 2 °F (36 2 °C) (01/08/19 1416)    Pulse 80 (01/08/19 1416)   Resp 20 (01/08/19 1416)    SpO2 100 % (01/08/19 1416)

## 2019-01-08 NOTE — OP NOTE
OPERATIVE REPORT - Podiatry  PATIENT NAME: Jhon Carson    :  1960  MRN: [id number]  Pt Location:  OR ROOM 11    SURGERY DATE: 2019    Surgeon(s) and Role:     * Cher Arvizu DPM - Primary     * Bon Griffiths DPM - Assisting    Pre-op Diagnosis:  1  Partial tear Achilles tendon of right lower extremity [M76 61]  2  Calcaneal spur of right foot [M77 31]    Post-Op Diagnosis Codes:  1  Partial tear Achilles tendon of right lower extremity [M76 61]  2  Calcaneal spur of right foot [M77 31]    Procedure(s) (LRB):  1  Partial Achilles tendon tear repair right lower extremity  2  Removal of posterior calcaneal spur of right foot  3  Application of short-leg splint    Specimen(s):  * No specimens in log *    Estimated Blood Loss:   Minimal    Anesthesia Type:   General w/ Popliteal Block  Postoperative block consisting of 7 cc of 0 5% bupivacaine plain  Hemostasis:  Right pneumatic thigh tourniquet at 325 mm per Hg for 60 minutes  Materials:  3-0 Vicryl  4-0 Vicryl  4-0 Prolene  2x Memo 2 5 x 10 mm Sonic anchor  2x Memo reelX 5 5 mm anchor    Operative Findings:  Consistent with diagnosis  Complications:   None    Procedure and Technique:     Patient received a popliteal block preoperatively  Under mild sedation, the patient was brought into the operating room and placed on the operating room table in the prone position  A pneumatic thigh tourniquet was then placed around the patient's right thigh with ample webril padding  A time out was performed to confirm the correct patient, procedure and site with all parties in agreement  The foot was then scrubbed, prepped and draped in the usual aseptic manner  An esmarch bandage was utilized to exsangunate the patients foot and the pneumatic ankle tourniquet was then inflated  The esmarch bandage was removed and the foot was placed on the operating room table      Attention was directed to the posterior achilles tendon where an approximate 5 cm longitudinal incision was made through the skin using a 15 blade  Blunt dissection through the subcutaneous tissue was performed, small vessels were cauterized for hemostasis  A longitudinal incision was made through the peritenon along the entire length of the surgical site  This was reflected medially and laterally with care to protect the tissue thus exposing the tendon  A "T" type incision was made through the insertional part of the Achilles tendon and reflected medial and laterally off the underlying bone  It was noted that there were osteophyte formations on the posterior superior lateral aspect of the calcaneus as well as the lateral aspect of the calcaneus  Using a sagittal saw, the osteophytes from the heel were removed  A bur was then used to remove remaining osteophytes  The surgical incision was then flushed with copious amounts of normal sterile saline  It was noted at this time that the osteophyte formations were removed  The Achilles tendon was then reapproximated back to the posterior calcaneus using 2x Memo 2 5 x 10 mm Sonic anchor approximately, and 2x Memo reelX 5 5 mm anchor distally per manufacture protocol  The peritenon was then reapproximated using 3-0 Vicryl and a running suture-type fashion  There was adequate peritenon to completely cover the tendon  3-0 Vicryl was then also used to reapproximate subcutaneous tissues in a interrupted simple technique  4-0 Prolene was then used to reapproximate the skin  Postoperative block consisting of 7 cc of 0 5% bupivacaine plain was injected  The surgical incision was then covered with Betadine-soaked Adaptic, dry sterile dressing, and David  This was then covered with Coban, mild Villarreal compression, and posterior splint  The pneumatic ankle tourniquet was deflated and normal hyperemic flush was noted to all digits  The patient tolerated the procedure and anesthesia well and was transported to the PACU with vital signs stable  SIGNATURE: Cassandra Pugh DPM  DATE: January 8, 2019  TIME: 2:17 PM

## 2019-01-08 NOTE — ANESTHESIA POSTPROCEDURE EVALUATION
Post-Op Assessment Note      CV Status:  Stable    Mental Status:  Alert and awake    Hydration Status:  Euvolemic    PONV Controlled:  Controlled    Airway Patency:  Patent  Airway: intubated    Post Op Vitals Reviewed: Yes          Staff: AnesthesiologistFERMIN           /64 (01/08/19 1416)    Temp (!) 97 2 °F (36 2 °C) (01/08/19 1416)    Pulse 80 (01/08/19 1416)   Resp 20 (01/08/19 1416)    SpO2 100 % (01/08/19 1416)

## 2019-01-08 NOTE — PERIOPERATIVE NURSING NOTE
oob ambulated to br with nwb on right leg  Dressing dry and intact  Voided  Wants to go home  Discharged via w/c after discharge instructions given and verbalized an understanding of same

## 2019-01-08 NOTE — H&P (VIEW-ONLY)
Assessment/Plan:   1  Preop cardiovascular exam/Heel spur, right  Patient appears well today  She has a good functional capacity and no active cardiac problems  Reviewed her preop testing  EKG appears stable today  This type of procedure is considered low to intermediate risk  Patient is cleared with intermediate perioperative cardiovascular risk  No further testing needed today  Diagnoses and all orders for this visit:    Preop cardiovascular exam    Heel spur, right          Subjective:    Chief Complaint   Patient presents with    Pre-op Exam     right foot surgery Dr Flores 11/20        Patient ID: Chandler Syed is a 66 y o  female  Chandler Lottelsen  66 y o   female    SURGEON: Dr Hua Stacks: Heel spur    DATE OF SURGERY: 1/8/19    PRIOR ANESTHESIA:no    COMPLICATION: no    BLEEDING PROBLEM: no    PERTINENT PMH: no    EXERCISE CAPACITY:   CAN WALK 4 BLOCKS AND OR CLIMB 2 FLIGHTS: Yes    HOME LIVING SITUATION SAFE AND SECURE: Yes      TOBACCO: no     ETOH: yes     ILLEGAL DRUGS: no          Review of Systems      The following portions of the patient's history were reviewed and updated as appropriate : past family history, past medical history, past social history and past surgical history        Current Outpatient Prescriptions:     ALPRAZolam (XANAX) 0 5 mg tablet, Take by mouth 2 (two) times a day as needed, Disp: , Rfl:     Ascorbic Acid (VITAMIN C) 100 MG tablet, Take 100 mg by mouth daily, Disp: , Rfl:     b complex vitamins tablet, Take 1 tablet by mouth daily, Disp: , Rfl:     celecoxib (CeleBREX) 200 mg capsule, Take 200 mg by mouth daily, Disp: , Rfl:     cholecalciferol (VITAMIN D3) 1,000 units tablet, Take 1 tablet by mouth daily, Disp: , Rfl:     ergocalciferol (VITAMIN D2) 50,000 units, Take 1,000 Units by mouth daily  , Disp: , Rfl:     Multiple Vitamin (MULTIVITAMIN) tablet, Take 1 tablet by mouth daily, Disp: , Rfl:     Red Yeast Rice 600 MG CAPS, Take by mouth, Disp: , Rfl:     VITAMIN B COMPLEX-C CAPS, Take 1 capsule by mouth daily, Disp: , Rfl:     Objective:    Vitals:    12/18/18 1222   BP: 120/80   BP Location: Left arm   Patient Position: Sitting   Cuff Size: Adult   Pulse: 71   Resp: 16   Temp: 97 5 °F (36 4 °C)   TempSrc: Tympanic   SpO2: 99%   Weight: 73 9 kg (163 lb)   Height: 5' 2 21" (1 58 m)        Physical Exam   Constitutional: She is oriented to person, place, and time  She appears well-developed and well-nourished  HENT:   Head: Normocephalic and atraumatic  Nose: Nose normal    Mouth/Throat: No oropharyngeal exudate  Eyes: Pupils are equal, round, and reactive to light  Right eye exhibits no discharge  Left eye exhibits no discharge  Neck: Normal range of motion  Neck supple  No tracheal deviation present  Cardiovascular: Normal rate, regular rhythm and intact distal pulses  Exam reveals no gallop and no friction rub  No murmur heard  Pulses:       Dorsalis pedis pulses are 2+ on the right side, and 2+ on the left side  Posterior tibial pulses are 2+ on the right side, and 2+ on the left side  Pulmonary/Chest: Effort normal and breath sounds normal  No respiratory distress  She has no wheezes  She has no rales  Abdominal: Soft  Bowel sounds are normal  She exhibits no distension  There is no tenderness  There is no rebound and no guarding  Musculoskeletal: Normal range of motion  She exhibits no edema  Lymphadenopathy:        Head (right side): No submental and no submandibular adenopathy present  Head (left side): No submental and no submandibular adenopathy present  She has no cervical adenopathy  Right cervical: No superficial cervical, no deep cervical and no posterior cervical adenopathy present  Left cervical: No superficial cervical, no deep cervical and no posterior cervical adenopathy present  Neurological: She is alert and oriented to person, place, and time   No cranial nerve deficit or sensory deficit  Skin: Skin is warm, dry and intact  Psychiatric: Her speech is normal and behavior is normal  Judgment normal  Her mood appears not anxious  Cognition and memory are normal  She does not exhibit a depressed mood  Vitals reviewed

## 2019-01-08 NOTE — ANESTHESIA PROCEDURE NOTES
Peripheral Block    Patient location during procedure: holding area  Start time: 1/8/2019 12:09 PM  Reason for block: at surgeon's request and post-op pain management  Staffing  Anesthesiologist: ZULLY Hua  Preanesthetic Checklist  Completed: patient identified, site marked, surgical consent, pre-op evaluation, timeout performed, IV checked, risks and benefits discussed and monitors and equipment checked  Peripheral Block  Patient position: right lateral decubitus  Prep: ChloraPrep  Patient monitoring: heart rate, cardiac monitor, continuous pulse ox and frequent blood pressure checks  Block type: popliteal  Laterality: right  Injection technique: single-shot  Procedures: Doppler guided and nerve stimulator  Ultrasound permanent image saved  Local infiltration: ropivacaine  Infiltration strength: 0 5 %  Dose: 28 mL  Needle  Needle type: Stimuplex   Needle gauge: 22 G  Needle length: 5 cm  Needle localization: anatomical landmarks, nerve stimulator and ultrasound guidance  Needle insertion depth: 3 9 cm  Test dose: negative  Assessment  Injection assessment: incremental injection, local visualized surrounding nerve on ultrasound, negative aspiration for heme and no paresthesia on injection  Heart rate change: no  Slow fractionated injection: yes  Post-procedure:  site cleaned  patient tolerated the procedure well with no immediate complications  Additional Notes  Right popliteal block done  In left lateral decubitus position under US guidance but successful visualyzation     Well tolerated by pt without any known complications  Pt remained ACV  No S/S of IV injection or rapid reabsorption

## 2019-01-08 NOTE — ANESTHESIA PROCEDURE NOTES
Peripheral Block    Patient location during procedure: holding area  Start time: 1/8/2019 12:09 PM  Staffing  Anesthesiologist: ZULLY Linares  Performed: anesthesiologist   Preanesthetic Checklist  Completed: patient identified, site marked, surgical consent, pre-op evaluation, timeout performed, IV checked, risks and benefits discussed and monitors and equipment checked  Peripheral Block  Patient position: left lateral decubitus  Prep: ChloraPrep  Patient monitoring: heart rate, cardiac monitor, continuous pulse ox and frequent blood pressure checks  Block type: popliteal  Laterality: right  Injection technique: single-shot  Procedures: Doppler guided and nerve stimulator  Ultrasound permanent image saved  Local infiltration: ropivacaine  Infiltration strength: 0 5 %  Dose: 28 mL  Needle  Needle type: Stimuplex   Needle gauge: 22 G  Needle length: 5 cm  Needle localization: nerve stimulator and ultrasound guidance  Needle insertion depth: 3 9 cm  Test dose: negative  Assessment  Injection assessment: incremental injection, local visualized surrounding nerve on ultrasound, negative aspiration for heme and no paresthesia on injection  Heart rate change: no  Slow fractionated injection: yes  Post-procedure:  site cleaned  patient tolerated the procedure well with no immediate complications  Additional Notes  Pt sedated but remained ACV  Stimuplex 0 53  Slow incremental injection without any S/S of IV or rapid absorption  US used Sonogram attached  Well tolerated by pt    Area cleaned and dried

## 2019-01-08 NOTE — DISCHARGE SUMMARY
Discharge Summary Outpatient Procedure Podiatry -   Jhon Carson 66 y o  female MRN: [id number]  Unit/Bed#: OR POOL Encounter: [id number]    Admission Date: 1/8/2019     Admitting Diagnosis:   1  Partial tear Achilles tendon of right lower extremity [M76 61]  2  Calcaneal spur of right foot [M77 31]    Discharge Diagnosis: same    Procedures Performed:   1  Partial Achilles tendon tear repair right lower extremity  2  Removal of posterior calcaneal spur of right foot  3  Application of short-leg splint    Complications: none    Condition at Discharge: stable    Discharge instructions/Information to patient and family:   See after visit summary for information provided to patient and family  Provisions for Follow-Up Care/Important appointments:  See after visit summary for information related to follow-up care and any pertinent home health orders  Discharge Medications:  See after visit summary for reconciled discharge medications provided to patient and family

## 2019-01-08 NOTE — PHYSICAL THERAPY NOTE
[facility] PT Note    Time In: 11:33  Time Out: 11:48    Patient Name: Marty Ortiz    Patient : 1960    Physician Name:  Dr Renato Storey DPM     Physician Order / Christiano Holter Status: 19 PT Eval and Treat/NWB RLE    Diagnosis: Heel spur, right  Procedure: removal of painful heel spur with repair of Achilles tendon        Pt seen pre- op   Pt fit for SW but one available in stock to small  Nursing to contact appropriate staff for getting taller walker  Needs to be one notch higher  Pt demonstrates safe ambulation on level surfaces with SW     Pt demonstrates ability to perform elevations safely with SW reversed method for ascending  Family  Was  present during training  Pt/family demonstrates understanding of instruction provided      Batsheva Ortega PT

## 2019-01-08 NOTE — DISCHARGE INSTRUCTIONS
Dr Walt Kwan Instructions    1  Take your prescribed medication as directed  2  Upon arrival at home, lie down and elevate your surgical foot on 2 pillows  3  Remain quiet, off your feet as much as possible, for the first 24-48 hours  This is when your feet first swell and may become painful  After 48 hours you may begin limited walking following these restrictions:   Non-weightbearing to surgical foot  4  Drink large quantities of water  Consume no alcohol  Continue a well-balanced diet  5  Report any unusual discomfort or fever to this office  6  A limited amount of discomfort and swelling is to be expected  In some cases the skin may take on a bruised appearance  The surgical solution that was applied to your foot prior to the operation is dark in color and the operation site may appear to be oozing when it actually is not  7  A slight amount of blood is to be expected, and is no cause for alarm  Do not remove the dressings  If there is active bleeding and if the bleeding persists, add additional gauze to the bandage, apply direct pressure, elevate your feet and call this office  8  Do not get the dressings wet  As regular bathing may be inconvenient, sponge baths are recommended  9  When anesthesia wears off and if any discomfort should be present, apply an ice pack directly over the operated area for 15 minute intervals for several hours or until the pain leaves  (USE IN EXCESS OF 15 MINUTES COULD CAUSE FROSTBITE)  Do not use hot water bags or electric pads  A convenient icepack can be made by placing ice cubes in a plastic bag and covering this with a towel  10  If necessary, take a mild laxative before retiring  11  Wear your special open shoes anytime you put weight on your foot, even if it is just to walk to the bathroom and back  It will probably be 2 or 3 weeks before you will be permitted to try regular shoes    12  Having performed the operation, we are interested in a prompt recovery  Please cooperate by following the above instructions  13  Please call to confirm your post-op appointment or call with any other questions

## 2019-01-08 NOTE — OR NURSING
ANESTHESIA BLOCK IN HA AS FOLLOWS:  [id number]     [id number]     VITALS AS LISTED IN VITALS  PT TOLERATED PROCEDURE WELL

## 2019-02-06 ENCOUNTER — HOSPITAL ENCOUNTER (OUTPATIENT)
Dept: NON INVASIVE DIAGNOSTICS | Facility: HOSPITAL | Age: 59
Discharge: HOME | End: 2019-02-06
Attending: PODIATRIST
Payer: COMMERCIAL

## 2019-02-06 ENCOUNTER — TRANSCRIBE ORDERS (OUTPATIENT)
Dept: ADMINISTRATIVE | Facility: HOSPITAL | Age: 59
End: 2019-02-06

## 2019-02-06 DIAGNOSIS — E40 EDEMA DUE TO KWASHIORKOR (HCC): ICD-10-CM

## 2019-02-06 DIAGNOSIS — E40 EDEMA DUE TO KWASHIORKOR (HCC): Primary | ICD-10-CM

## 2019-02-06 PROCEDURE — 93971 EXTREMITY STUDY: CPT

## 2019-02-07 PROCEDURE — 93971 EXTREMITY STUDY: CPT | Performed by: SURGERY

## 2019-03-19 ENCOUNTER — TELEPHONE (OUTPATIENT)
Dept: FAMILY MEDICINE CLINIC | Facility: CLINIC | Age: 59
End: 2019-03-19

## 2019-03-19 NOTE — TELEPHONE ENCOUNTER
Call patient, she can  application form, which I partially completed    She will not need to complete the remainder of the format and get notarized to send to [facility]

## 2019-03-19 NOTE — TELEPHONE ENCOUNTER
Pt called requesting a new form for a handicap placard and asked about a permanent card, I am unsure how she obtains this

## 2019-03-19 NOTE — TELEPHONE ENCOUNTER
I do not know the difference between the colors, but her application form is for a permanent placard

## 2019-06-04 PROBLEM — Z00.00 WELL ADULT EXAM: Status: RESOLVED | Noted: 2018-11-20 | Resolved: 2019-06-04

## 2019-06-05 ENCOUNTER — OFFICE VISIT (OUTPATIENT)
Dept: FAMILY MEDICINE CLINIC | Facility: CLINIC | Age: 59
End: 2019-06-05
Payer: COMMERCIAL

## 2019-06-05 VITALS
OXYGEN SATURATION: 97 % | BODY MASS INDEX: 29.55 KG/M2 | HEART RATE: 71 BPM | HEIGHT: 63 IN | DIASTOLIC BLOOD PRESSURE: 78 MMHG | WEIGHT: 166.8 LBS | TEMPERATURE: 98 F | SYSTOLIC BLOOD PRESSURE: 122 MMHG

## 2019-06-05 DIAGNOSIS — F41.9 ANXIETY: Primary | ICD-10-CM

## 2019-06-05 DIAGNOSIS — E78.2 MIXED HYPERLIPIDEMIA: ICD-10-CM

## 2019-06-05 DIAGNOSIS — K12.0 APHTHOUS STOMATITIS: ICD-10-CM

## 2019-06-05 DIAGNOSIS — E55.9 VITAMIN D DEFICIENCY: ICD-10-CM

## 2019-06-05 DIAGNOSIS — M25.50 ARTHRALGIA OF MULTIPLE JOINTS: ICD-10-CM

## 2019-06-05 PROCEDURE — 1036F TOBACCO NON-USER: CPT | Performed by: FAMILY MEDICINE

## 2019-06-05 PROCEDURE — 99214 OFFICE O/P EST MOD 30 MIN: CPT | Performed by: FAMILY MEDICINE

## 2019-06-05 PROCEDURE — 3725F SCREEN DEPRESSION PERFORMED: CPT | Performed by: FAMILY MEDICINE

## 2019-06-05 PROCEDURE — 3008F BODY MASS INDEX DOCD: CPT | Performed by: FAMILY MEDICINE

## 2019-09-16 ENCOUNTER — OFFICE VISIT (OUTPATIENT)
Dept: FAMILY MEDICINE CLINIC | Facility: CLINIC | Age: 59
End: 2019-09-16
Payer: COMMERCIAL

## 2019-09-16 VITALS
SYSTOLIC BLOOD PRESSURE: 110 MMHG | HEIGHT: 63 IN | TEMPERATURE: 98.2 F | WEIGHT: 161 LBS | OXYGEN SATURATION: 98 % | HEART RATE: 79 BPM | RESPIRATION RATE: 16 BRPM | DIASTOLIC BLOOD PRESSURE: 70 MMHG | BODY MASS INDEX: 28.53 KG/M2

## 2019-09-16 DIAGNOSIS — L24.9 IRRITANT CONTACT DERMATITIS, UNSPECIFIED TRIGGER: Primary | ICD-10-CM

## 2019-09-16 DIAGNOSIS — Z23 NEED FOR VACCINATION: ICD-10-CM

## 2019-09-16 PROCEDURE — 90471 IMMUNIZATION ADMIN: CPT | Performed by: FAMILY MEDICINE

## 2019-09-16 PROCEDURE — 90715 TDAP VACCINE 7 YRS/> IM: CPT | Performed by: FAMILY MEDICINE

## 2019-09-16 PROCEDURE — 99213 OFFICE O/P EST LOW 20 MIN: CPT | Performed by: FAMILY MEDICINE

## 2019-09-16 PROCEDURE — 3008F BODY MASS INDEX DOCD: CPT | Performed by: FAMILY MEDICINE

## 2019-09-16 RX ORDER — BETAMETHASONE DIPROPIONATE 0.5 MG/G
CREAM TOPICAL 2 TIMES DAILY
Qty: 30 G | Refills: 1 | Status: SHIPPED | OUTPATIENT
Start: 2019-09-16 | End: 2019-12-13

## 2019-09-16 RX ORDER — METHYLPREDNISOLONE 4 MG/1
TABLET ORAL
Qty: 21 TABLET | Refills: 0 | Status: SHIPPED | OUTPATIENT
Start: 2019-09-16 | End: 2019-12-11 | Stop reason: ALTCHOICE

## 2019-09-16 NOTE — PROGRESS NOTES
[facility]      NAME: Justin Spence  AGE: 64 y o  SEX: female  : 1960   MRN: [id number]    DATE: 2019  TIME: 2:18 PM    Assessment and Plan     Problem List Items Addressed This Visit     Irritant contact dermatitis - Primary     Patient presents with 2 week history of itchy rash spot on left buttock  In the past few days she also has a similar spot on her left knee  She is also somewhat itchy on her neck lately  No recent known exposures  She says that her sister has pets and she  Visits their house frequently  Upon examination,  erythem patches/vesicles L buttock (7X6 cm) and L knee (2 X 1 cm)  Will give Rx for Medrol Dosepak and diprolene cream b i d  P r n     Continue OTC Benadryl  Call if further problems                   Relevant Medications    methylPREDNISolone 4 MG tablet therapy pack    betamethasone, augmented, (DIPROLENE-AF) 0 05 % cream      Other Visit Diagnoses     Need for vaccination                  Return to office in: call further probs  patient requesting Adacel today due to new granddaughter   she will also need flu shot  She will get this once it is available    Chief Complaint     Chief Complaint   Patient presents with    Rash     left buttock x 2 weeks       History of Present Illness      Patient presents with 2 week history of itchy rash spot on left buttock  In the past few days she also has a similar spot on her left knee  She is also somewhat itchy on her neck lately  No recent known exposures  She says that her sister has pets and she  Visits their house frequently  The following portions of the patient's history were reviewed and updated as appropriate: allergies, current medications, past family history, past medical history, past social history, past surgical history and problem list     Review of Systems   Review of Systems   Respiratory: Negative  Cardiovascular: Negative  Gastrointestinal: Negative  Genitourinary: Negative  Skin: Positive for rash  Active Problem List     Patient Active Problem List   Diagnosis    Anxiety    Hyperlipidemia    Solitary pulmonary nodule    Vitamin D deficiency    Arthralgia of multiple joints    Tear of right supraspinatus tendon    Aphthous stomatitis    Irritant contact dermatitis       Objective   /70 (BP Location: Left arm, Patient Position: Sitting, Cuff Size: Adult)   Pulse 79   Temp 98 2 °F (36 8 °C) (Tympanic)   Resp 16   Ht 5' 2 99" (1 6 m)   Wt 73 kg (161 lb)   SpO2 98%   BMI 28 53 kg/m²     Physical Exam   Skin:   erythem patches/vesicles L buttock (7X6 cm) and L knee (2 X 1 cm)          Pertinent Laboratory/Diagnostic Studies:  none    Current Medications     Current Outpatient Medications:     al mag oxide-diphenhydramine-lidocaine viscous (MAGIC MOUTHWASH) 1:1:1 suspension, Swish and spit 10 mL every 4 (four) hours as needed for mouth pain or discomfort, Disp: 120 mL, Rfl: 2    ALPRAZolam (XANAX) 0 5 mg tablet, Take by mouth 2 (two) times a day as needed, Disp: , Rfl:     Ascorbic Acid (VITAMIN C) 100 MG tablet, Take 100 mg by mouth daily, Disp: , Rfl:     b complex vitamins tablet, Take 1 tablet by mouth daily, Disp: , Rfl:     calcium carbonate (OS-LITA) 600 MG tablet, Take 600 mg by mouth 2 (two) times a day with meals, Disp: , Rfl:     celecoxib (CeleBREX) 200 mg capsule, Take 200 mg by mouth daily, Disp: , Rfl:     cholecalciferol (VITAMIN D3) 1,000 units tablet, Take 1,000 Units by mouth daily  , Disp: , Rfl:     ergocalciferol (VITAMIN D2) 50,000 units, Take 1,000 Units by mouth daily  , Disp: , Rfl:     Multiple Vitamin (MULTIVITAMIN) tablet, Take 1 tablet by mouth daily, Disp: , Rfl:     betamethasone, augmented, (DIPROLENE-AF) 0 05 % cream, Apply topically 2 (two) times a day, Disp: 30 g, Rfl: 1    methylPREDNISolone 4 MG tablet therapy pack, Use as directed on package, Disp: 21 tablet, Rfl: 0    Health Maintenance Health Maintenance   Topic Date Due    Pneumococcal Vaccine: Pediatrics (0 to 5 Years) and At-Risk Patients (6 to 59 Years) (1 of 3 - PCV13) 08/07/1968    BMI: Followup Plan  08/07/1980    INFLUENZA VACCINE  11/20/2019 (Originally 7/1/2019)    MAMMOGRAM  12/21/2019    Depression Screening PHQ  06/05/2020    BMI: Adult  06/05/2020    CRC Screening: Colonoscopy  04/26/2021    DTaP,Tdap,and Td Vaccines (2 - Td) 01/27/2026    Pneumococcal Vaccine: 65+ Years (1 of 2 - PCV13) 08/07/2027    Hepatitis C Screening  Completed    HEPATITIS B VACCINES  Aged Out     Immunization History   Administered Date(s) Administered    Tdap 01/27/2016       DO Santiago Kirkpatrick [facility] Group

## 2019-12-11 ENCOUNTER — OFFICE VISIT (OUTPATIENT)
Dept: FAMILY MEDICINE CLINIC | Facility: CLINIC | Age: 59
End: 2019-12-11
Payer: COMMERCIAL

## 2019-12-11 VITALS
HEART RATE: 71 BPM | WEIGHT: 153.25 LBS | RESPIRATION RATE: 17 BRPM | SYSTOLIC BLOOD PRESSURE: 110 MMHG | TEMPERATURE: 97.1 F | DIASTOLIC BLOOD PRESSURE: 72 MMHG | HEIGHT: 63 IN | BODY MASS INDEX: 27.15 KG/M2 | OXYGEN SATURATION: 97 %

## 2019-12-11 DIAGNOSIS — M25.50 ARTHRALGIA OF MULTIPLE JOINTS: ICD-10-CM

## 2019-12-11 DIAGNOSIS — L30.0 NUMMULAR ECZEMA: ICD-10-CM

## 2019-12-11 DIAGNOSIS — F41.9 ANXIETY: Primary | ICD-10-CM

## 2019-12-11 DIAGNOSIS — E78.2 MIXED HYPERLIPIDEMIA: ICD-10-CM

## 2019-12-11 PROBLEM — L24.9 IRRITANT CONTACT DERMATITIS: Status: RESOLVED | Noted: 2019-09-16 | Resolved: 2019-12-11

## 2019-12-11 PROCEDURE — 99214 OFFICE O/P EST MOD 30 MIN: CPT | Performed by: FAMILY MEDICINE

## 2019-12-11 PROCEDURE — 3008F BODY MASS INDEX DOCD: CPT | Performed by: FAMILY MEDICINE

## 2019-12-11 PROCEDURE — 1036F TOBACCO NON-USER: CPT | Performed by: FAMILY MEDICINE

## 2019-12-11 RX ORDER — GABAPENTIN 100 MG/1
100 CAPSULE ORAL 3 TIMES DAILY
Refills: 3 | COMMUNITY
Start: 2019-11-20

## 2019-12-11 RX ORDER — HALOBETASOL PROPIONATE 0.05 %
OINTMENT (GRAM) TOPICAL 2 TIMES DAILY
Qty: 30 G | Refills: 3 | Status: SHIPPED | OUTPATIENT
Start: 2019-12-11 | End: 2019-12-12

## 2019-12-11 NOTE — PROGRESS NOTES
[facility]      NAME: Fany Vigil  AGE: 64 y o  SEX: female  : 1960   MRN: [id number]    DATE: 2019  TIME: 11:38 AM    Assessment and Plan     Problem List Items Addressed This Visit     Anxiety - Primary      Doing well on rare usage of alprazolam 0 5 mg  Patient denies any side effects         Hyperlipidemia      No longer taking red yeast rice  Patient states she is now eating healthier and more active  Will check labs in near future  Will call with results         Arthralgia of multiple joints      Stable doing well on Celebrex 200 mg daily without side effects         Nummular eczema      Patient with persistent dry itchy rash on torso and back  Was given prednisone and diprolene at last visit which work temporarily  Will switch to Ultravate ointment b i d  P r n  If symptoms persist after 2 weeks, recommend punch biopsy         Relevant Medications    halobetasol (ULTRAVATE) 0 05 % ointment              Return to office in:  Patient advised to get fasting blood work done in near future at [facility]  Will call with results     follow up 6 months ( labs most likely yearly)    Chief Complaint     Chief Complaint   Patient presents with    Follow-up     6m check up to chronic conditions with no recent labs     Red spots all over body     very itchy x x 3 months  Pt was seen on 19 for Sx's       History of Present Illness      Patient presents for recheck of chronic medical problems today  Overall she is feeling well  Doing well on Celebrex for arthritis  Doing well rare use of alprazolam for anxiety  Patient no longer taking red yeast rice for cholesterol  Patient has not gotten her fasting blood work done yet  She does complain of ongoing itchy spots on her torso  She has tried prednisone and topical cream without benefit        The following portions of the patient's history were reviewed and updated as appropriate: allergies, current medications, past family history, past medical history, past social history, past surgical history and problem list     Review of Systems   Review of Systems   Respiratory: Negative  Cardiovascular: Negative  Gastrointestinal: Negative  Genitourinary: Negative  Skin: Positive for rash  Active Problem List     Patient Active Problem List   Diagnosis    Anxiety    Hyperlipidemia    Solitary pulmonary nodule    Vitamin D deficiency    Arthralgia of multiple joints    Tear of right supraspinatus tendon    Aphthous stomatitis    Nummular eczema       Objective   /72 (BP Location: Left arm, Patient Position: Sitting, Cuff Size: Adult)   Pulse 71   Temp (!) 97 1 °F (36 2 °C) (Tympanic)   Resp 17   Ht 5' 3" (1 6 m)   Wt 69 5 kg (153 lb 4 oz)   SpO2 97%   Breastfeeding? No   BMI 27 15 kg/m²     Physical Exam   Cardiovascular: Normal rate, regular rhythm, normal heart sounds and intact distal pulses  Carotids: no bruits  Ext: no edema   Pulmonary/Chest: Effort normal  No respiratory distress  She has no wheezes  She has no rales  Skin:     Few scattered erythematous nummular patches on torso and back   Psychiatric: She has a normal mood and affect   Her behavior is normal  Thought content normal        Pertinent Laboratory/Diagnostic Studies:   none    Current Medications     Current Outpatient Medications:     al mag oxide-diphenhydramine-lidocaine viscous (MAGIC MOUTHWASH) 1:1:1 suspension, Swish and spit 10 mL every 4 (four) hours as needed for mouth pain or discomfort, Disp: 120 mL, Rfl: 2    ALPRAZolam (XANAX) 0 5 mg tablet, Take by mouth 2 (two) times a day as needed, Disp: , Rfl:     Ascorbic Acid (VITAMIN C) 100 MG tablet, Take 100 mg by mouth daily, Disp: , Rfl:     b complex vitamins tablet, Take 1 tablet by mouth daily, Disp: , Rfl:     betamethasone, augmented, (DIPROLENE-AF) 0 05 % cream, Apply topically 2 (two) times a day, Disp: 30 g, Rfl: 1    calcium carbonate (OS-LITA) 600 MG tablet, Take 600 mg by mouth 2 (two) times a day with meals, Disp: , Rfl:     celecoxib (CeleBREX) 200 mg capsule, Take 200 mg by mouth daily, Disp: , Rfl:     cholecalciferol (VITAMIN D3) 1,000 units tablet, Take 1,000 Units by mouth daily  , Disp: , Rfl:     ergocalciferol (VITAMIN D2) 50,000 units, Take 1,000 Units by mouth daily  , Disp: , Rfl:     gabapentin (NEURONTIN) 100 mg capsule, Take 100 mg by mouth 3 (three) times a day , Disp: , Rfl: 3    Multiple Vitamin (MULTIVITAMIN) tablet, Take 1 tablet by mouth daily, Disp: , Rfl:     halobetasol (ULTRAVATE) 0 05 % ointment, Apply topically 2 (two) times a day, Disp: 30 g, Rfl: 3    Health Maintenance     Health Maintenance   Topic Date Due    HIV Screening  08/07/1977    BMI: Followup Plan  08/07/1980    Cervical Cancer Screening  08/07/1983    MAMMOGRAM  12/21/2019    Depression Screening PHQ  06/05/2020    BMI: Adult  12/11/2020    CRC Screening: Colonoscopy  04/26/2021    Pneumococcal Vaccine: 65+ Years (1 of 2 - PCV13) 08/07/2027    DTaP,Tdap,and Td Vaccines (3 - Td) 09/16/2029    Hepatitis C Screening  Completed    Influenza Vaccine  Completed    Pneumococcal Vaccine: Pediatrics (0 to 5 Years) and At-Risk Patients (6 to 59 Years)  Aged Out    HIB Vaccine  Aged Out    Hepatitis B Vaccine  Aged Out    IPV Vaccine  Aged Out    Hepatitis A Vaccine  Aged Out    Meningococcal ACWY Vaccine  Aged Out    HPV Vaccine  Aged [employer] History   Administered Date(s) Administered    INFLUENZA 09/24/2019    Tdap 01/27/2016, 09/16/2019       Umair Aquiles, DO  [facility]

## 2019-12-11 NOTE — ASSESSMENT & PLAN NOTE
Patient with persistent dry itchy rash on torso and back  Was given prednisone and diprolene at last visit which work temporarily  Will switch to Ultravate ointment b i d  P r n   If symptoms persist after 2 weeks, recommend punch biopsy

## 2019-12-11 NOTE — ASSESSMENT & PLAN NOTE
No longer taking red yeast rice  Patient states she is now eating healthier and more active  Will check labs in near future    Will call with results

## 2019-12-12 ENCOUNTER — APPOINTMENT (OUTPATIENT)
Dept: LAB | Facility: CLINIC | Age: 59
End: 2019-12-12
Payer: COMMERCIAL

## 2019-12-12 ENCOUNTER — TELEPHONE (OUTPATIENT)
Dept: FAMILY MEDICINE CLINIC | Facility: CLINIC | Age: 59
End: 2019-12-12

## 2019-12-12 DIAGNOSIS — E78.2 MIXED HYPERLIPIDEMIA: ICD-10-CM

## 2019-12-12 DIAGNOSIS — L30.0 NUMMULAR ECZEMA: Primary | ICD-10-CM

## 2019-12-12 DIAGNOSIS — F41.9 ANXIETY: ICD-10-CM

## 2019-12-12 LAB
ALBUMIN SERPL BCP-MCNC: 3.5 G/DL (ref 3.5–5)
ALP SERPL-CCNC: 68 U/L (ref 46–116)
ALT SERPL W P-5'-P-CCNC: 33 U/L (ref 12–78)
ANION GAP SERPL CALCULATED.3IONS-SCNC: 4 MMOL/L (ref 4–13)
AST SERPL W P-5'-P-CCNC: 19 U/L (ref 5–45)
BASOPHILS # BLD AUTO: 0.04 THOUSANDS/ΜL (ref 0–0.1)
BASOPHILS NFR BLD AUTO: 1 % (ref 0–1)
BILIRUB SERPL-MCNC: 0.34 MG/DL (ref 0.2–1)
BUN SERPL-MCNC: 28 MG/DL (ref 5–25)
CALCIUM SERPL-MCNC: 9.3 MG/DL (ref 8.3–10.1)
CHLORIDE SERPL-SCNC: 109 MMOL/L (ref 100–108)
CHOLEST SERPL-MCNC: 250 MG/DL (ref 50–200)
CO2 SERPL-SCNC: 27 MMOL/L (ref 21–32)
CREAT SERPL-MCNC: 0.63 MG/DL (ref 0.6–1.3)
EOSINOPHIL # BLD AUTO: 0.08 THOUSAND/ΜL (ref 0–0.61)
EOSINOPHIL NFR BLD AUTO: 1 % (ref 0–6)
ERYTHROCYTE [DISTWIDTH] IN BLOOD BY AUTOMATED COUNT: 13.2 % (ref 11.6–15.1)
GFR SERPL CREATININE-BSD FRML MDRD: 100 ML/MIN/1.73SQ M
GLUCOSE P FAST SERPL-MCNC: 86 MG/DL (ref 65–99)
HCT VFR BLD AUTO: 41.6 % (ref 34.8–46.1)
HDLC SERPL-MCNC: 59 MG/DL
HGB BLD-MCNC: 13.3 G/DL (ref 11.5–15.4)
IMM GRANULOCYTES # BLD AUTO: 0.01 THOUSAND/UL (ref 0–0.2)
IMM GRANULOCYTES NFR BLD AUTO: 0 % (ref 0–2)
LDLC SERPL CALC-MCNC: 169 MG/DL (ref 0–100)
LYMPHOCYTES # BLD AUTO: 2.9 THOUSANDS/ΜL (ref 0.6–4.47)
LYMPHOCYTES NFR BLD AUTO: 44 % (ref 14–44)
MCH RBC QN AUTO: 28.4 PG (ref 26.8–34.3)
MCHC RBC AUTO-ENTMCNC: 32 G/DL (ref 31.4–37.4)
MCV RBC AUTO: 89 FL (ref 82–98)
MONOCYTES # BLD AUTO: 0.56 THOUSAND/ΜL (ref 0.17–1.22)
MONOCYTES NFR BLD AUTO: 9 % (ref 4–12)
NEUTROPHILS # BLD AUTO: 2.98 THOUSANDS/ΜL (ref 1.85–7.62)
NEUTS SEG NFR BLD AUTO: 45 % (ref 43–75)
NRBC BLD AUTO-RTO: 0 /100 WBCS
PLATELET # BLD AUTO: 294 THOUSANDS/UL (ref 149–390)
PMV BLD AUTO: 9.7 FL (ref 8.9–12.7)
POTASSIUM SERPL-SCNC: 3.7 MMOL/L (ref 3.5–5.3)
PROT SERPL-MCNC: 7.1 G/DL (ref 6.4–8.2)
RBC # BLD AUTO: 4.68 MILLION/UL (ref 3.81–5.12)
SODIUM SERPL-SCNC: 140 MMOL/L (ref 136–145)
TRIGL SERPL-MCNC: 109 MG/DL
TSH SERPL DL<=0.05 MIU/L-ACNC: 1.63 UIU/ML (ref 0.36–3.74)
WBC # BLD AUTO: 6.57 THOUSAND/UL (ref 4.31–10.16)

## 2019-12-12 PROCEDURE — 84443 ASSAY THYROID STIM HORMONE: CPT

## 2019-12-12 PROCEDURE — 36415 COLL VENOUS BLD VENIPUNCTURE: CPT

## 2019-12-12 PROCEDURE — 80053 COMPREHEN METABOLIC PANEL: CPT

## 2019-12-12 PROCEDURE — 80061 LIPID PANEL: CPT

## 2019-12-12 PROCEDURE — 85025 COMPLETE CBC W/AUTO DIFF WBC: CPT

## 2019-12-12 NOTE — TELEPHONE ENCOUNTER
Return call from patient, she states her insurance will not pay for ointment and is requesting an alternative  She uses [facility] in [facility]  Please advise

## 2019-12-12 NOTE — TELEPHONE ENCOUNTER
Voicemail from patient stating that [facility] does not have the halobetasol ointment  LMOM for her to call back and let us know if she wants to try another pharmacy, or if she wants us to prescribe a different ointment

## 2019-12-13 ENCOUNTER — TELEPHONE (OUTPATIENT)
Dept: FAMILY MEDICINE CLINIC | Facility: CLINIC | Age: 59
End: 2019-12-13

## 2019-12-13 DIAGNOSIS — L30.0 NUMMULAR ECZEMA: Primary | ICD-10-CM

## 2019-12-13 RX ORDER — BETAMETHASONE DIPROPIONATE 0.5 MG/G
OINTMENT TOPICAL 2 TIMES DAILY
Qty: 30 G | Refills: 1 | Status: SHIPPED | OUTPATIENT
Start: 2019-12-13

## 2019-12-13 NOTE — TELEPHONE ENCOUNTER
Patient called back after speaking with her insurance and was told they will cover Betamethasone  Is this a suitable substitute?   Uses [facility]

## 2019-12-13 NOTE — TELEPHONE ENCOUNTER
Phone call from patient stating the Halobetalol cream was also denied  I asked her to contact her insurance company to find out what IS covered, then we can go from there

## 2019-12-24 ENCOUNTER — HOSPITAL ENCOUNTER (OUTPATIENT)
Dept: RADIOLOGY | Age: 59
Discharge: HOME | End: 2019-12-24
Payer: COMMERCIAL

## 2019-12-24 VITALS — BODY MASS INDEX: 27.11 KG/M2 | WEIGHT: 153 LBS | HEIGHT: 63 IN

## 2019-12-24 DIAGNOSIS — Z12.31 ENCOUNTER FOR SCREENING MAMMOGRAM FOR MALIGNANT NEOPLASM OF BREAST: ICD-10-CM

## 2019-12-24 PROCEDURE — 77067 SCR MAMMO BI INCL CAD: CPT

## 2019-12-24 PROCEDURE — 77063 BREAST TOMOSYNTHESIS BI: CPT

## 2020-03-24 NOTE — ASSESSMENT & PLAN NOTE
Recheck of ongoing multiple joint arthralgias (especially in ankles, knees, hips, shoulders)  Pain is worse in morning  No rashes  Recent labs revealed +RF and CRP  Pt has appointment w/ rheumatologist on 7/31  She wants to know if there is anything else she can do in the meantime    Will start course of prednisone; 40 mg daily x 5 days, 30 mg daily x 10 days, 20 mg daily x 15 days, then 10 mg daily until her appointment with Rheumatology on July 31st   Patient is instructed to call if any further problems
Goals of care, counseling/discussion
Goals of care, counseling/discussion

## 2023-03-21 ENCOUNTER — TELEPHONE (OUTPATIENT)
Dept: FAMILY MEDICINE CLINIC | Facility: CLINIC | Age: 63
End: 2023-03-21

## 2023-03-30 NOTE — TELEPHONE ENCOUNTER
03/30/23 2:28 PM     The office's request has been received, reviewed, and the patient chart updated  The PCP has successfully been removed with a patient attribution note  This message will now be completed      Thank you  Sherren Hirschfeld

## 2023-04-14 NOTE — ASSESSMENT & PLAN NOTE
Patient presents with 2 week history of itchy rash spot on left buttock  In the past few days she also has a similar spot on her left knee  She is also somewhat itchy on her neck lately  No recent known exposures  She says that her sister has pets and she  Visits their house frequently  Upon examination,  erythem patches/vesicles L buttock (7X6 cm) and L knee (2 X 1 cm)  Will give Rx for Medrol Dosepak and diprolene cream b i d  P r n     Continue OTC Benadryl    Call if further problems
Yes

## (undated) DEVICE — PAD GROUNDING ADULT

## (undated) DEVICE — NEEDLE 25G X 1 1/2

## (undated) DEVICE — CHLORAPREP HI-LITE 26ML ORANGE

## (undated) DEVICE — 4-PORT MANIFOLD: Brand: NEPTUNE 2

## (undated) DEVICE — DRAPE C-ARM X-RAY

## (undated) DEVICE — CUFF TOURNIQUET DISP SZ30

## (undated) DEVICE — INTENDED FOR TISSUE SEPARATION, AND OTHER PROCEDURES THAT REQUIRE A SHARP SURGICAL BLADE TO PUNCTURE OR CUT.: Brand: BARD-PARKER SAFETY BLADES SIZE 15, STERILE

## (undated) DEVICE — ACE WRAP 6 IN STERILE

## (undated) DEVICE — SUT PROLENE 4-0 PS-2 18 IN 8682G

## (undated) DEVICE — STOCKINETTE 2P PREROLLD 6X60

## (undated) DEVICE — STERILE POLYISOPRENE POWDER-FREE SURGICAL GLOVES WITH EMOLLIENT COATING: Brand: PROTEXIS

## (undated) DEVICE — SUT VICRYL 4-0 PS-2 18 IN J496G

## (undated) DEVICE — COBAN 4 IN STERILE

## (undated) DEVICE — 6.5MM BIOZIP DRILL 4.0MM X 13.5MM: Brand: BIOZIP

## (undated) DEVICE — STERILE POLYISOPRENE POWDER-FREE SURGICAL GLOVES: Brand: PROTEXIS

## (undated) DEVICE — BETHLEHEM UNIVERSAL  MIONR EXT: Brand: CARDINAL HEALTH

## (undated) DEVICE — TUBING SUCTION 5MM X 12 FT

## (undated) DEVICE — SUT VICRYL 2-0 SH 27 IN UNDYED J417H

## (undated) DEVICE — PENCIL ELECTROSURG E-Z CLEAN -0035H

## (undated) DEVICE — CURITY NON-ADHERENT STRIPS: Brand: CURITY

## (undated) DEVICE — SUT VICRYL 3-0 SH 27 IN J416H

## (undated) DEVICE — POV-IOD SOLUTION 4OZ BT

## (undated) DEVICE — SMALL TEAR CROSS CUT RASP (11.0 X 5.0MM)

## (undated) DEVICE — DRILL: Brand: SONICFUSION

## (undated) DEVICE — ACE WRAP 4 IN STERILE

## (undated) DEVICE — INTENDED FOR TISSUE SEPARATION, AND OTHER PROCEDURES THAT REQUIRE A SHARP SURGICAL BLADE TO PUNCTURE OR CUT.: Brand: BARD-PARKER ® SAFETYLOCK CARBON RIB-BACK BLADES

## (undated) DEVICE — SYRINGE 10ML LL CONTROL TOP

## (undated) DEVICE — NEEDLE BLUNT 18 G X 1 1/2IN

## (undated) DEVICE — STRETCH BANDAGE: Brand: CURITY